# Patient Record
Sex: FEMALE | Race: WHITE | NOT HISPANIC OR LATINO | Employment: FULL TIME | URBAN - NONMETROPOLITAN AREA
[De-identification: names, ages, dates, MRNs, and addresses within clinical notes are randomized per-mention and may not be internally consistent; named-entity substitution may affect disease eponyms.]

---

## 2018-10-04 ENCOUNTER — OFFICE VISIT CONVERTED (OUTPATIENT)
Dept: FAMILY MEDICINE CLINIC | Age: 53
End: 2018-10-04
Attending: FAMILY MEDICINE

## 2019-08-29 ENCOUNTER — OFFICE VISIT CONVERTED (OUTPATIENT)
Dept: FAMILY MEDICINE CLINIC | Age: 54
End: 2019-08-29
Attending: FAMILY MEDICINE

## 2019-08-30 ENCOUNTER — HOSPITAL ENCOUNTER (OUTPATIENT)
Dept: OTHER | Facility: HOSPITAL | Age: 54
Discharge: HOME | End: 2019-08-30
Attending: FAMILY MEDICINE

## 2019-08-30 LAB
ALBUMIN SERPL-MCNC: 4.1 G/DL (ref 3.5–5)
ALBUMIN/GLOB SERPL: 1.2 {RATIO} (ref 1.4–2.6)
ALP SERPL-CCNC: 53 U/L (ref 53–141)
ALT SERPL-CCNC: 11 U/L (ref 10–40)
ANION GAP SERPL CALC-SCNC: 17 MMOL/L (ref 8–19)
AST SERPL-CCNC: 15 U/L (ref 15–50)
BASOPHILS # BLD AUTO: 0.03 10*3/UL (ref 0–0.2)
BASOPHILS NFR BLD AUTO: 0.4 % (ref 0–3)
BILIRUB SERPL-MCNC: 0.63 MG/DL (ref 0.2–1.3)
BUN SERPL-MCNC: 8 MG/DL (ref 5–25)
BUN/CREAT SERPL: 8 {RATIO} (ref 6–20)
CALCIUM SERPL-MCNC: 8.7 MG/DL (ref 8.7–10.4)
CHLORIDE SERPL-SCNC: 100 MMOL/L (ref 99–111)
CHOLEST SERPL-MCNC: 133 MG/DL (ref 107–200)
CHOLEST/HDLC SERPL: 3 {RATIO} (ref 3–6)
CONV ABS IMM GRAN: 0.02 10*3/UL (ref 0–0.2)
CONV CO2: 24 MMOL/L (ref 22–32)
CONV IMMATURE GRAN: 0.3 % (ref 0–1.8)
CONV TOTAL PROTEIN: 7.4 G/DL (ref 6.3–8.2)
CREAT UR-MCNC: 0.96 MG/DL (ref 0.5–0.9)
DEPRECATED RDW RBC AUTO: 47.8 FL (ref 36.4–46.3)
EOSINOPHIL # BLD AUTO: 0.11 10*3/UL (ref 0–0.7)
EOSINOPHIL # BLD AUTO: 1.6 % (ref 0–7)
ERYTHROCYTE [DISTWIDTH] IN BLOOD BY AUTOMATED COUNT: 13.4 % (ref 11.7–14.4)
GFR SERPLBLD BASED ON 1.73 SQ M-ARVRAT: >60 ML/MIN/{1.73_M2}
GLOBULIN UR ELPH-MCNC: 3.3 G/DL (ref 2–3.5)
GLUCOSE SERPL-MCNC: 94 MG/DL (ref 65–99)
HCT VFR BLD AUTO: 40.1 % (ref 37–47)
HDLC SERPL-MCNC: 45 MG/DL (ref 40–60)
HGB BLD-MCNC: 12.5 G/DL (ref 12–16)
LDLC SERPL CALC-MCNC: 76 MG/DL (ref 70–100)
LYMPHOCYTES # BLD AUTO: 2.07 10*3/UL (ref 1–5)
LYMPHOCYTES NFR BLD AUTO: 30.4 % (ref 20–45)
MCH RBC QN AUTO: 30.3 PG (ref 27–31)
MCHC RBC AUTO-ENTMCNC: 31.2 G/DL (ref 33–37)
MCV RBC AUTO: 97.1 FL (ref 81–99)
MONOCYTES # BLD AUTO: 0.56 10*3/UL (ref 0.2–1.2)
MONOCYTES NFR BLD AUTO: 8.2 % (ref 3–10)
NEUTROPHILS # BLD AUTO: 4.01 10*3/UL (ref 2–8)
NEUTROPHILS NFR BLD AUTO: 59.1 % (ref 30–85)
NRBC CBCN: 0 % (ref 0–0.7)
OSMOLALITY SERPL CALC.SUM OF ELEC: 282 MOSM/KG (ref 273–304)
PLATELET # BLD AUTO: 209 10*3/UL (ref 130–400)
PMV BLD AUTO: 13.9 FL (ref 9.4–12.3)
POTASSIUM SERPL-SCNC: 4 MMOL/L (ref 3.5–5.3)
RBC # BLD AUTO: 4.13 10*6/UL (ref 4.2–5.4)
SODIUM SERPL-SCNC: 137 MMOL/L (ref 135–147)
TRIGL SERPL-MCNC: 61 MG/DL (ref 40–150)
TROPONIN T SERPL-MCNC: <0.01 NG/ML (ref 0–0.1)
TSH SERPL-ACNC: 3.33 M[IU]/L (ref 0.27–4.2)
VLDLC SERPL-MCNC: 12 MG/DL (ref 5–37)
WBC # BLD AUTO: 6.8 10*3/UL (ref 4.8–10.8)

## 2019-10-31 ENCOUNTER — OFFICE VISIT CONVERTED (OUTPATIENT)
Dept: FAMILY MEDICINE CLINIC | Age: 54
End: 2019-10-31
Attending: FAMILY MEDICINE

## 2020-02-27 ENCOUNTER — HOSPITAL ENCOUNTER (OUTPATIENT)
Dept: OTHER | Facility: HOSPITAL | Age: 55
Discharge: HOME | End: 2020-02-27
Attending: FAMILY MEDICINE

## 2020-02-27 ENCOUNTER — OFFICE VISIT CONVERTED (OUTPATIENT)
Dept: FAMILY MEDICINE CLINIC | Age: 55
End: 2020-02-27
Attending: FAMILY MEDICINE

## 2020-07-23 ENCOUNTER — HOSPITAL ENCOUNTER (OUTPATIENT)
Dept: OTHER | Facility: HOSPITAL | Age: 55
Discharge: HOME | End: 2020-07-23
Attending: FAMILY MEDICINE

## 2020-07-23 LAB
ALBUMIN SERPL-MCNC: 3.9 G/DL (ref 3.5–5)
ALBUMIN/GLOB SERPL: 1.5 {RATIO} (ref 1.4–2.6)
ALP SERPL-CCNC: 50 U/L (ref 53–141)
ALT SERPL-CCNC: 9 U/L (ref 10–40)
ANION GAP SERPL CALC-SCNC: 14 MMOL/L (ref 8–19)
AST SERPL-CCNC: 15 U/L (ref 15–50)
BASOPHILS # BLD MANUAL: 0.03 10*3/UL (ref 0–0.2)
BASOPHILS NFR BLD MANUAL: 0.5 % (ref 0–3)
BILIRUB SERPL-MCNC: 0.56 MG/DL (ref 0.2–1.3)
BUN SERPL-MCNC: 7 MG/DL (ref 5–25)
BUN/CREAT SERPL: 8 {RATIO} (ref 6–20)
CALCIUM SERPL-MCNC: 9.3 MG/DL (ref 8.7–10.4)
CHLORIDE SERPL-SCNC: 103 MMOL/L (ref 99–111)
CHOLEST SERPL-MCNC: 133 MG/DL (ref 107–200)
CHOLEST/HDLC SERPL: 2.8 {RATIO} (ref 3–6)
CONV CO2: 25 MMOL/L (ref 22–32)
CONV TOTAL PROTEIN: 6.5 G/DL (ref 6.3–8.2)
CREAT UR-MCNC: 0.91 MG/DL (ref 0.5–0.9)
DEPRECATED RDW RBC AUTO: 46.9 FL
EOSINOPHIL # BLD MANUAL: 0.06 10*3/UL (ref 0–0.7)
EOSINOPHIL NFR BLD MANUAL: 1 % (ref 0–7)
ERYTHROCYTE [DISTWIDTH] IN BLOOD BY AUTOMATED COUNT: 13.2 % (ref 11.5–14.5)
GFR SERPLBLD BASED ON 1.73 SQ M-ARVRAT: >60 ML/MIN/{1.73_M2}
GLOBULIN UR ELPH-MCNC: 2.6 G/DL (ref 2–3.5)
GLUCOSE SERPL-MCNC: 92 MG/DL (ref 65–99)
GRANS (ABSOLUTE): 3.66 10*3/UL (ref 2–8)
GRANS: 58.4 % (ref 30–85)
HBA1C MFR BLD: 12 G/DL (ref 12–16)
HCT VFR BLD AUTO: 37 % (ref 37–47)
HDLC SERPL-MCNC: 47 MG/DL (ref 40–60)
IMM GRANULOCYTES # BLD: 0.01 10*3/UL (ref 0–0.54)
IMM GRANULOCYTES NFR BLD: 0.2 % (ref 0–0.43)
LDLC SERPL CALC-MCNC: 74 MG/DL (ref 70–100)
LYMPHOCYTES # BLD MANUAL: 1.93 10*3/UL (ref 1–5)
LYMPHOCYTES NFR BLD MANUAL: 9.1 % (ref 3–10)
MCH RBC QN AUTO: 30.9 PG (ref 27–31)
MCHC RBC AUTO-ENTMCNC: 32.4 G/DL (ref 33–37)
MCV RBC AUTO: 95.4 FL (ref 81–99)
MONOCYTES # BLD AUTO: 0.57 10*3/UL (ref 0.2–1.2)
OSMOLALITY SERPL CALC.SUM OF ELEC: 284 MOSM/KG (ref 273–304)
PLATELET # BLD AUTO: 206 10*3/UL (ref 130–400)
PMV BLD AUTO: 12.4 FL (ref 7.4–10.4)
POTASSIUM SERPL-SCNC: 4.2 MMOL/L (ref 3.5–5.3)
RBC # BLD AUTO: 3.88 10*6/UL (ref 4.2–5.4)
SODIUM SERPL-SCNC: 138 MMOL/L (ref 135–147)
TRIGL SERPL-MCNC: 59 MG/DL (ref 40–150)
TSH SERPL-ACNC: 1.41 M[IU]/L (ref 0.27–4.2)
VARIANT LYMPHS NFR BLD MANUAL: 30.8 % (ref 20–45)
VLDLC SERPL-MCNC: 12 MG/DL (ref 5–37)
WBC # BLD AUTO: 6.26 10*3/UL (ref 4.8–10.8)

## 2020-08-13 ENCOUNTER — OFFICE VISIT CONVERTED (OUTPATIENT)
Dept: FAMILY MEDICINE CLINIC | Age: 55
End: 2020-08-13
Attending: FAMILY MEDICINE

## 2021-05-18 NOTE — PROGRESS NOTES
Jennifer Lu  1965     Office/Outpatient Visit    Visit Date: Thu, Aug 13, 2020 11:21 am    Provider: Tomas Mac MD (Assistant: Maryam Weaver MA)    Location: [facility]        Electronically signed by Tomas Mac MD on  08/13/2020 01:37:37 PM                             Subjective:        CC: Mrs. Lu is a 55 year old White female.  This is a follow-up visit.  pt states she is only taking fluoxetine         HPI: BP today is 110/66 with a HR of 79.      Anxiety is well controlled on prozac 20 mg qd.      Pt was not able to complete PT due to COVID pandemic. She looked up shoulder exercises and she is working on posture, stretching, strength exercises and left shoulder pain improved in about 3 weeks. This helped with numbness and tingling from shoulder to finger tips. Left shoulder and cervical spine x-ray on 2/27/20 left shoulder was about the same, just mild arthritis. C-spine shows multilevel degenerative disc disease greatest from C5-7. There is neural foraminal narrowing (this is the holes spinal nerves exit through) greatest on the right at C5-6 and on the left at C4-5.    ROS:     CONSTITUTIONAL:  Negative for fatigue and fever.      EYES:  Negative for blurred vision.      E/N/T:  Negative for diminished hearing and nasal congestion.      CARDIOVASCULAR:  Negative for chest pain ( resolved ) and palpitations.      RESPIRATORY:  Negative for recent cough and dyspnea.      GASTROINTESTINAL:  Negative for abdominal pain, constipation, diarrhea, nausea and vomiting.      GENITOURINARY:  Negative for dysuria and urinary incontinence.      MUSCULOSKELETAL:  Negative for arthralgias, limb pain ( resolved ) and myalgias.      INTEGUMENTARY/BREAST:  Negative for atypical mole(s) and rash.      NEUROLOGICAL:  Positive for paresthesia ( left upper extremity ).   Negative for weakness.      PSYCHIATRIC:  Negative for anxiety ( (better with prozac) ), depression and sleep disturbance.           Past Medical History / Family History / Social History:         Last Reviewed on 2020 12:27 PM by Tomas Mac    Past Medical History:                 PAST MEDICAL HISTORY         Fracture(s): right leg, near ankle, tibia;     Frequent Sinusitis     wears glasses         GYNECOLOGICAL HISTORY:        No pregnancy-related problems.    Problems with menstrual cycles include heavy bleeding.      Menopause at age partial hyst.    Last Pap was august; No abnormal Paps Hospitalizations: Never         PREVENTIVE HEALTH MAINTENANCE             COLORECTAL CANCER SCREENING: Up to date (colonoscopy q10y; sigmoidoscopy q5y; Cologuard q3y) was last done 2017, Results are in chart; colonoscopy with the following abnormalities noted-- Polyp(s); The next colonoscopy is due    (milagros)     Hepatitis C Medicare Screening: not indicated     MAMMOGRAM: was last done      PAP SMEAR: was last done 2020 with normal results Dr. Norma Villanueva in [facility]         Surgical History:         Positive for    Cholecystectomy,    : X 2;,    Hysterectomy: partial; and    Tubal Ligation: done during c sect; ;     Procedures:    EGD ( GERD 16 )    LEEP procedure (  )     COLONOSCOPY: was last done 16 with the following abnormalaties noted-- gastritis /polyps /tubular adenoma Dr Martinez         Family History:     Father: Healthy     Mother: Arrhythmia ( tachycardia ); Hyperlipidemia;  Breast Cancer;  Asthma; COPD;  Hypothyroidism     Sister(s): Healthy; 2 sister(s) total     Paternal Grandfather: Cause of death was old age     Paternal Grandmother: Coronary Artery Disease     Maternal Grandfather: Cerebrovascular Accident     Maternal Grandmother: Cerebrovascular Accident     two maternal aunts and maturnal grandmother with thyroid disease         Social History:     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          Alcohol:  Does not drink alcohol and never has.      Caffeine:  She admits to consuming caffeine via tea ( 4 servings per day ).          Substance Abuse History:     Last Reviewed on 8/13/2020 12:27 PM by Tomas Mac        Mental Health History:     Last Reviewed on 8/13/2020 12:27 PM by Tomas Mac        Communicable Diseases (eg STDs):     Last Reviewed on 8/13/2020 12:27 PM by Tomas Mac        Current Problems:     Last Reviewed on 8/13/2020 12:27 PM by Tomas Mac    Anxiety disorder, unspecified    Migraine with aura, not intractable, without status migrainosus    Pain in left arm        Immunizations:     Fluzone Quadrivalent (3+ years) 10/3/2018    Influenza Virus Vaccine, unspecified formulation 10/1/2016        Allergies:     Last Reviewed on 8/13/2020 12:27 PM by Tomas Mac    shell fish:      Penicillins:          Current Medications:     Last Reviewed on 8/13/2020 12:27 PM by Tomas Mac    FLUoxetine 20 mg oral tablet [TAKE ONE TABLET BY MOUTH DAILY]    allergy shot 1 x month     traZODone 50 mg oral tablet [1 tab HS PRN]    Esgic 50mg/325mg/40mg Tablet [Take 2 at onset of headache, then 1 Q 1 TO 2 hrs ha. Max 5 in 12 hours]        Objective:        Vitals:         Current: 8/13/2020 11:28:13 AM    Ht:  5 ft, 5.25 in;  Wt: 163 lbs;  BMI: 26.9T: 97.3 F (temporal);  BP: 110/66 mm Hg (left arm, sitting);  P: 79 bpm (left arm (BP Cuff), sitting);  sCr: 0.91 mg/dL;  GFR: 73.60        Exams:     PHYSICAL EXAM:     GENERAL: vital signs recorded - well developed, well nourished;  well groomed;  no apparent distress;     EYES: extraocular movements intact; conjunctiva and cornea are normal; PERRLA;     NECK: range of motion is normal; thyroid exam reveals not enlarged;     RESPIRATORY: normal respiratory rate and pattern with no distress; normal  breath sounds with no rales, rhonchi, wheezes or rubs;     CARDIOVASCULAR: normal rate; rhythm is regular;  no systolic murmur; no edema;     GASTROINTESTINAL: nontender; normal bowel sounds;     MUSCULOSKELETAL: normal gait; normal overall tone spine: no scoliosis or other abnormal spinal curvatures;     NEUROLOGIC: mental status: alert and oriented x 3; GROSSLY INTACT     PSYCHIATRIC: appropriate affect and demeanor; normal psychomotor function; normal speech pattern; normal thought and perception;         Assessment:         F41.9   Anxiety disorder, unspecified       M79.602   Pain in left arm           ORDERS:         Meds Prescribed:       [Refilled] FLUoxetine 20 mg oral tablet [TAKE ONE TABLET BY MOUTH DAILY], #90 (ninety) tablets, Refills: 3 (three)       [Refilled] FLUoxetine 20 mg oral tablet [TAKE ONE TABLET BY MOUTH DAILY], #30 (thirty) tablets, Refills: 0 (zero)                 Plan:         Anxiety disorder, unspecifiedWell controlled on prozac 20 mg qd. 30 day supply sent to [facility] and annual supply to [facility].          Prescriptions:       [Refilled] FLUoxetine 20 mg oral tablet [TAKE ONE TABLET BY MOUTH DAILY], #90 (ninety) tablets, Refills: 3 (three)       [Refilled] FLUoxetine 20 mg oral tablet [TAKE ONE TABLET BY MOUTH DAILY], #30 (thirty) tablets, Refills: 0 (zero)         Pain in left armFortunately this has resolved with home PT exercises.             Charge Capture:         Primary Diagnosis:     F41.9  Anxiety disorder, unspecified           Orders:      [id number]  Office/outpatient visit; established patient, level 3  (In-House)              M79.602  Pain in left arm

## 2021-05-18 NOTE — PROGRESS NOTES
"Jennifer Lu. 1965     Office/Outpatient Visit    Visit Date: Thu, Oct 31, 2019 10:14 am    Provider: Tomas Mac MD (Assistant: Kae Quiles LPN)    Location: [facility]        Electronically signed by Tomas Mac MD on  10/31/2019 05:28:27 PM                             SUBJECTIVE:        CC:     Ms. Lu is a 54 year old White female.  2 month follow up;         HPI:     Anxiety is better after starting prozac 2 months ago. She was stressed out by \"life\", mother has COPD and  has health issues. This has helped her deal with the anxiety.     Previously having upper chest pain just below the collar bone. This was an intermittent issue for the last year. Stress test was done 19 Normal, reassuring stress test, normal LV, EF 58%.     ROS:     CONSTITUTIONAL:  Negative for fatigue and fever.      EYES:  Negative for blurred vision.      E/N/T:  Negative for diminished hearing and nasal congestion.      CARDIOVASCULAR:  Negative for chest pain ( resolved ) and palpitations.      RESPIRATORY:  Positive for dyspnea ( at rest ).   Negative for recent cough.      GASTROINTESTINAL:  Negative for abdominal pain, constipation, diarrhea, nausea and vomiting.      GENITOURINARY:  Negative for dysuria and urinary incontinence.      MUSCULOSKELETAL:  Negative for arthralgias and myalgias.      INTEGUMENTARY/BREAST:  Negative for atypical mole(s) and rash.      NEUROLOGICAL:  Negative for paresthesias and weakness.      PSYCHIATRIC:  Positive for anxiety ( (better with prozac) ).   Negative for depression or sleep disturbance.          PMH/FM/SH:     Last Reviewed on 10/31/2019 05:26 PM by Tomas Mac    Past Medical History:                 PAST MEDICAL HISTORY         Fracture(s): right leg, near ankle, tibia;     Frequent Sinusitis     wears glasses         GYNECOLOGICAL HISTORY:        No pregnancy-related problems.    Problems with menstrual cycles include heavy bleeding. "      Menopause at age partial hyst.    Last Pap was august; No abnormal Paps Hospitalizations: Never         PREVENTIVE HEALTH MAINTENANCE             COLORECTAL CANCER SCREENING: Up to date (colonoscopy q10y; sigmoidoscopy q5y; Cologuard q3y) was last done 2017, Results are in chart; colonoscopy with the following abnormalities noted-- Polyp(s); The next colonoscopy is due    (milagros)     Hepatitis C Medicare Screening: not indicated     MAMMOGRAM: was last done , scheduled for 10/11/18     PAP SMEAR: No longer indicated due to age and history         Surgical History:         Positive for    Cholecystectomy,    : X 2;,    Hysterectomy: partial; and    Tubal Ligation: done during c sect; ;     Procedures:    EGD ( GERD 16 )    LEEP procedure (  )     COLONOSCOPY: was last done 16 with the following abnormalaties noted-- gastritis /polyps /tubular adenoma Dr Martinez         Family History:     Father: Healthy     Mother: Arrhythmia ( tachycardia ); Hyperlipidemia;  Breast Cancer;  Asthma; COPD;  Hypothyroidism     Sister(s): Healthy; 2 sister(s) total     Paternal Grandfather: Cause of death was old age     Paternal Grandmother: Coronary Artery Disease     Maternal Grandfather: Cerebrovascular Accident     Maternal Grandmother: Cerebrovascular Accident     two maternal aunts and maturnal grandmother with thyroid disease         Social History:     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      Caffeine:  She admits to consuming caffeine via tea ( 4 servings per day ).          Substance Abuse History:     Last Reviewed on 10/31/2019 05:26 PM by Tomas Mac        Mental Health History:     Last Reviewed on 10/31/2019 05:26 PM by  Tomas Mac        Communicable Diseases (eg STDs):     Last Reviewed on 10/31/2019 05:26 PM by Tomas Mac            Current Problems:     Last Reviewed on 10/31/2019 05:26 PM by Tomas Mac    Classic migraine     Fatigue     Family history of coronary artery disease     Supraventricular tachycardia     Anxiety     Family history of CVA     Chest pain, other type         Immunizations:     Fluzone Quadrivalent (3+ years) 10/3/2018     Influenza Virus Vaccine, unspecified formulation 10/1/2016         Allergies:     Last Reviewed on 10/31/2019 05:26 PM by Tomas Mac    Penicillins:    shell fish:        Current Medications:     Last Reviewed on 10/31/2019 05:26 PM by Tomas Mac    Esgic 50mg/325mg/40mg Tablet Take 2 at onset of headache, then 1 Q 1 TO 2 hrs ha. Max 5 in 12 hours     Fluoxetine 10mg Tablet Take 1 tablet(s) by mouth daily     Trazodone HCl 50mg Tablet 1 tab HS PRN     allergy shot 1 x month         OBJECTIVE:        Vitals:         Current: 10/31/2019 10:23:08 AM    Ht:  5 ft, 5.25 in;  Wt: 188.6 lbs;  BMI: 31.1    T: 98.6 F (oral);  BP: 126/64 mm Hg (left arm, sitting);  P: 73 bpm (left arm (BP Cuff), sitting);  sCr: 0.96 mg/dL;  GFR: 75.07        Exams:     PHYSICAL EXAM:     GENERAL: vital signs recorded - well developed, well nourished;  well groomed;  no apparent distress;     EYES: extraocular movements intact; conjunctiva and cornea are normal; PERRLA;     NECK: range of motion is normal; thyroid exam reveals not enlarged;     RESPIRATORY: normal respiratory rate and pattern with no distress; normal breath sounds with no rales, rhonchi, wheezes or rubs;     CARDIOVASCULAR: normal rate; rhythm is regular;  no systolic murmur; no edema;     GASTROINTESTINAL: nontender; normal bowel sounds;     MUSCULOSKELETAL: normal gait; normal overall tone     NEUROLOGIC: mental status: alert and oriented x 3; GROSSLY INTACT     PSYCHIATRIC: appropriate affect and demeanor; normal  psychomotor function; normal speech pattern; normal thought and perception;         ASSESSMENT           300.02   F41.9  Anxiety              DDx:     786.59   R07.89  Chest pain, other type              DDx:         ORDERS:         Meds Prescribed:       Refill of: Fluoxetine 10mg Tablet Take 1 tablet(s) by mouth daily  #90 (Ninety) tablet(s) Refills: 2         Lab Orders:       APPTO  Appointment need  (In-House)                   PLAN:          Anxiety Anxiety improved with prozac 10 mg qd. Pt advised we can increase this to 20 if anxiety worsens.         FOLLOW-UP: Schedule a follow-up visit in 6 months.:.            Prescriptions:       Refill of: Fluoxetine 10mg Tablet Take 1 tablet(s) by mouth daily  #90 (Ninety) tablet(s) Refills: 2           Orders:       APPTO  Appointment need  (In-House)            Chest pain, other type This appears to have been anxiety related as cardiac w/u is normal and this improved with prozac.             Patient Recommendations:        For  Anxiety:     Schedule a follow-up visit in 6 months.                APPOINTMENT INFORMATION:        Monday Tuesday Wednesday Thursday Friday Saturday Sunday            Time:___________________AM  PM   Date:_____________________             CHARGE CAPTURE           **Please note: ICD descriptions below are intended for billing purposes only and may not represent clinical diagnoses**        Primary Diagnosis:         300.02 Anxiety            F41.9    Anxiety disorder, unspecified              Orders:          [id number]   Office/outpatient visit; established patient, level 3  (In-House)             APPTO   Appointment need  (In-House)           786.59 Chest pain, other type            R07.89    Other chest pain

## 2021-05-18 NOTE — PROGRESS NOTES
Jennifer Lu 1965     Office/Outpatient Visit    Visit Date: Thu, Aug 29, 2019 02:11 pm    Provider: Tomas Mac MD (Assistant: Hodan Basurto MA)    Location: [facility]        Electronically signed by Tomas Mac MD on  08/29/2019 06:31:36 PM                             SUBJECTIVE:        CC:     Ms. Lu is a 54 year old White female.  She presents with back  pain.          HPI:     Pt reports upper back pain, between shoulder blades, area about 12 cm across. This has been present for about 6 months. Feels as something is touching her back all the time. Upper back pain is constant, kind of a nerve pain like almost numb but slightly painful. This is 3/10 in intensity, more just aggravating. Nothing seems to make that pain better or worse.     Upper chest pain, just below the collar bone, this started about 3 months ago. This pain is sharp, stabbing, and is 5/10 in intensity. This comes and goes, might last 15-20 minutes, typically happens almost every day, multiple times a day. 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 She is a little more anxious at work, she some times has to give 20 shots in a day to a single patient ([place]aine[place] out). She took medicine for anxiety several years ago (10 years ago). Maybe prozac and this helped some.     Pt reports long standing anxiety that maybe is a little worse recently. She took prozac several years ago (10 years ago) when her son was a teenager and going through personal issues. Prozac helped some. She is a little more anxious at work, she some times has to give 20 shots in a day to a single patient ([place] out) and this makes her worries about a patient getting a systemic reaction.     ROS:     CONSTITUTIONAL:  Negative for fatigue and fever.      EYES:  Negative for blurred vision.      E/N/T:  Negative for diminished hearing and nasal congestion.      CARDIOVASCULAR:  Positive for chest pain.    Negative for palpitations.      RESPIRATORY:  Positive for dyspnea ( at rest ).   Negative for recent cough.      GASTROINTESTINAL:  Negative for abdominal pain, constipation, diarrhea, nausea and vomiting.      GENITOURINARY:  Negative for dysuria and urinary incontinence.      MUSCULOSKELETAL:  Negative for arthralgias and myalgias.      INTEGUMENTARY/BREAST:  Negative for atypical mole(s) and rash.      NEUROLOGICAL:  Negative for paresthesias and weakness.      PSYCHIATRIC:  Negative for anxiety, depression and sleep disturbance.          PMH/FMH/SH:     Last Reviewed on 2019 06:30 PM by Tomas Mac    Past Medical History:                 PAST MEDICAL HISTORY         Fracture(s): right leg, near ankle, tibia;     Frequent Sinusitis     wears glasses         GYNECOLOGICAL HISTORY:        No pregnancy-related problems.    Problems with menstrual cycles include heavy bleeding.      Menopause at age partial hyst.    Last Pap was august; No abnormal Paps Hospitalizations: Never         PREVENTIVE HEALTH MAINTENANCE             COLORECTAL CANCER SCREENING: Up to date (colonoscopy q10y; sigmoidoscopy q5y; Cologuard q3y) was last done 2017, Results are in chart; colonoscopy with the following abnormalities noted-- Polyp(s); The next colonoscopy is due    (milagros)     Hepatitis C Medicare Screening: not indicated     MAMMOGRAM: was last done , scheduled for 10/11/18     PAP SMEAR: No longer indicated due to age and history         Surgical History:         Positive for    Cholecystectomy,    : X 2;,    Hysterectomy: partial; and    Tubal Ligation: done during c sect; ;     Procedures:    EGD ( GERD 16 )    LEEP procedure (  )     COLONOSCOPY: was last done 16 with the following abnormalaties noted-- gastritis /polyps /tubular adenoma Dr Martinez         Family History:     Father: Healthy     Mother: Arrhythmia ( tachycardia ); Hyperlipidemia;  Breast Cancer;   Asthma; COPD;  Hypothyroidism     Sister(s): Healthy; 2 sister(s) total     Paternal Grandfather: Cause of death was old age     Paternal Grandmother: Coronary Artery Disease     Maternal Grandfather: Cerebrovascular Accident     Maternal Grandmother: Cerebrovascular Accident     two maternal aunts and maturnal grandmother with thyroid disease         Social History:     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      Caffeine:  She admits to consuming caffeine via tea ( 4 servings per day ).          Substance Abuse History:     Last Reviewed on 8/29/2019 06:30 PM by Tomas Mac        Mental Health History:     Last Reviewed on 8/29/2019 06:30 PM by Tomas Mac        Communicable Diseases (eg STDs):     Last Reviewed on 8/29/2019 06:30 PM by Tomas Mac            Current Problems:     Last Reviewed on 8/29/2019 06:30 PM by Tomas Mac    Classic migraine     Fatigue     Family history of coronary artery disease     Supraventricular tachycardia     Anxiety     Family history of CVA     Chest pain     Upper back pain         Immunizations:     Fluzone Quadrivalent (3+ years) 10/3/2018     Influenza Virus Vaccine, unspecified formulation 10/1/2016         Allergies:     Last Reviewed on 8/29/2019 06:30 PM by Tomas Mac    Penicillins:    shell fish:        Current Medications:     Last Reviewed on 8/29/2019 06:30 PM by Tomas Mac    Esgic 50mg/325mg/40mg Tablet Take 2 at onset of headache, then 1 Q 1 TO 2 hrs ha. Max 5 in 12 hours     Trazodone HCl 50mg Tablet 1 tab HS PRN     allergy shot 1 x month         OBJECTIVE:        Vitals:         Current: 8/29/2019 2:17:22 PM    Ht:  5 ft, 5.25 in;  Wt: 190 lbs;  BMI: 31.4    T: 98.8 F (oral);  BP:  124/76 mm Hg (left arm, sitting);  P: 79 bpm (left arm (BP Cuff), sitting);  sCr: 0.91 mg/dL;  GFR: 79.44        Exams:     PHYSICAL EXAM:     GENERAL: vital signs recorded - well developed, well nourished;  well groomed;  no apparent distress;     EYES: extraocular movements intact; conjunctiva and cornea are normal; PERRLA;     NECK: range of motion is normal; thyroid exam reveals not enlarged;     RESPIRATORY: normal respiratory rate and pattern with no distress; normal breath sounds with no rales, rhonchi, wheezes or rubs;     CARDIOVASCULAR: normal rate; rhythm is regular;  no systolic murmur; no edema;     GASTROINTESTINAL: nontender; normal bowel sounds;     MUSCULOSKELETAL: normal gait; normal overall tone     NEUROLOGIC: mental status: alert and oriented x 3;     PSYCHIATRIC: affect/demeanor: anxious;  normal psychomotor function; normal speech pattern; normal thought and perception;         ASSESSMENT           724.1   M54.89  Upper back pain              DDx:     786.51   R07.89  Chest pain              DDx:     300.02   F41.9  Anxiety              DDx:         ORDERS:         Meds Prescribed:       Fluoxetine 10mg Tablet Take 1 tablet(s) by mouth daily  #30 (Thirty) tablet(s) Refills: 2         Lab Orders:       FUTURE  Future order to be done at patients convenience  (Send-Out)         [id number] CMP, CBC, TSH, LIPID: [id number], [id number], [id number], [id number]  (Send-Out)         21460  [facility] - [facility] Troponin, quantitative  (Send-Out)         APPTO  Appointment need  (In-House)                   PLAN:          Upper back pain This appears to be related to posture and presentation and exam is overall benign appearing. We discussed posture and I printed for her posture exercises for her.           Patient Education Handouts:       Exercises - Treatment for Low Back Pain    PTC           Chest pain Chest pain is most likely related to anxiety and we are restarting prozac for this. Because this is an  on-going cocern for her I think a more definitive cardiac w/u is warranted and pt is therefore referred for stress test. EKG 1 year ago with similar Sx was reassuring (as was CXR and labs). We will repeat labs and add troponin and fasting labs tomorrow is appropriate as there is low concern for ACS and this is more convenient for patient to get labs all at once.         TESTS/PROCEDURES:  Will proceed with Cardiolite Stress Test (nuclear) to be performed/scheduled now.      FOLLOW-UP: Schedule a follow-up visit in 2 months..      FOLLOW-UP TESTING #1: FOLLOW-UP LABORATORY:  Labs to be scheduled in the future include PHYSICAL PANEL; CMP, CBC, TSH, LIPID.            Orders:       FUTURE  Future order to be done at patients convenience  (Send-Out)         22434  [facility] PHYSICAL: CMP, CBC, TSH, LIPID: [id number], [id number], [id number], [id number]  (Send-Out)         82629  [facility] - [facility] Troponin, quantitative  (Send-Out)         APPTO  Appointment need  (In-House)            Anxiety Will restart fluoxetine 10 mg (pt prefers to stay on lowest dose) for anxiety.           Prescriptions:       Fluoxetine 10mg Tablet Take 1 tablet(s) by mouth daily  #30 (Thirty) tablet(s) Refills: 2             Patient Recommendations:        For  Chest pain:     Schedule a follow-up visit in 2 months.                APPOINTMENT INFORMATION:        Monday Tuesday Wednesday Thursday Friday Saturday Sunday            Time:___________________AM  PM   Date:_____________________         The following laboratory testing has been ordered:             CHARGE CAPTURE           **Please note: ICD descriptions below are intended for billing purposes only and may not represent clinical diagnoses**        Primary Diagnosis:         724.1 Upper back pain            M54.89    Other dorsalgia              Orders:          33928   Office/outpatient visit; established patient, level 4  (In-House)           786.51 Chest pain            R07.89    Other chest pain               Orders:          APPTO   Appointment need  (In-House)           300.02 Anxiety            F41.9    Anxiety disorder, unspecified

## 2021-05-18 NOTE — PROGRESS NOTES
"Jennifer Lu. 1965     Office/Outpatient Visit    Visit Date: Thu, Oct 4, 2018 09:47 am    Provider: Tomas Mac,   (Assistant: Amie Garcia MA)    Location: [facility]        Electronically signed by Tomas Mac,   on  10/04/2018 01:35:31 PM                             SUBJECTIVE:        CC:     Ms. Lu is a 53 year old White female.  physical exam patient presents today for physical exam.;         HPI:         Patient to be evaluated for health checkup.  Her last physical exam was 1 year ago.          PHQ-9 Depression Screening: Completed form scanned and in chart; Total Score 3 Alcohol Consumption Screening: Completed form scanned and in chart; Total Score 0     Occassionally feels as if she can't take a deep breath. Happens since June, might happen several times per day or not at all.     4/10, chest \"twinge\" not so much tightness. Not associated with anything. This also since June, may last a couple weeks several times per day and then just goes away. Not true pain but more \"twinge\" and not associated aforementioned inability to take a deep breath.     ROS:     CONSTITUTIONAL:  Negative for fatigue and fever.      EYES:  Negative for blurred vision.      E/N/T:  Negative for diminished hearing and nasal congestion.      CARDIOVASCULAR:  Positive for chest pain.   Negative for palpitations.      RESPIRATORY:  Positive for dyspnea ( at rest ).   Negative for recent cough.      GASTROINTESTINAL:  Negative for abdominal pain, constipation, diarrhea, nausea and vomiting.      GENITOURINARY:  Negative for dysuria and urinary incontinence.      MUSCULOSKELETAL:  Negative for arthralgias and myalgias.      INTEGUMENTARY/BREAST:  Negative for atypical mole(s) and rash.      NEUROLOGICAL:  Negative for paresthesias and weakness.      PSYCHIATRIC:  Negative for anxiety, depression and sleep disturbance.          PMH/FMH/SH:     Last Reviewed on 10/04/2018 01:33 PM by Tomas Mac    " Past Medical History:                 PAST MEDICAL HISTORY         Fracture(s): right leg, near ankle, tibia;     Frequent Sinusitis     wears glasses         GYNECOLOGICAL HISTORY:        No pregnancy-related problems.    Problems with menstrual cycles include heavy bleeding.      Menopause at age partial hyst.    Last Pap was august; No abnormal Paps Hospitalizations: Never         PREVENTIVE HEALTH MAINTENANCE             COLORECTAL CANCER SCREENING: Up to date (colonoscopy q10y; sigmoidoscopy q5y; Cologuard q3y) was last done 2017, Results are in chart; colonoscopy with the following abnormalities noted-- Polyp(s); The next colonoscopy is due    (milagros)     Hepatitis C Medicare Screening: not indicated     MAMMOGRAM: was last done , scheduled for 10/11/18 with normal results     PAP SMEAR: No longer indicated due to age and history         Surgical History:         Positive for    Cholecystectomy,    : X 2;,    Hysterectomy: partial; and    Tubal Ligation: done during c sect; ;     Procedures:    EGD ( GERD 16 )    LEEP procedure (  )     COLONOSCOPY: was last done 16 with the following abnormalaties noted-- gastritis /polyps /tubular adenoma Dr Martinez         Family History:     Father: Healthy     Mother: Arrhythmia ( tachycardia ); Hyperlipidemia;  Breast Cancer;  Asthma; COPD;  Hypothyroidism     Sister(s): Healthy; 2 sister(s) total     Paternal Grandfather: Cause of death was old age     Paternal Grandmother: Coronary Artery Disease     Maternal Grandfather: Cerebrovascular Accident     Maternal Grandmother: Cerebrovascular Accident     two maternal aunts and maturnal grandmother with thyroid disease         Social History:     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      Caffeine:  She admits to consuming caffeine via tea ( 4 servings per day ).          Substance Abuse History:     Last Reviewed on 10/04/2018 01:33 PM by Tomas Mac        Mental Health History:     Last Reviewed on 10/04/2018 01:33 PM by Tomas Mac        Communicable Diseases (eg STDs):     Last Reviewed on 10/04/2018 01:33 PM by Tomas Mac            Current Problems:     Last Reviewed on 10/04/2018 01:33 PM by Tomas Mac    Classic migraine     Fatigue     Family history of coronary artery disease     Anxiety     Supraventricular tachycardia     Family history of CVA     Chest pain, other type     Shortness of Breath     Screening for depression         Immunizations:     Fluzone Quadrivalent (3+ years) 10/3/2018     Influenza Virus Vaccine, unspecified formulation 10/1/2016         Allergies:     Last Reviewed on 10/04/2018 01:33 PM by Tomas Mac    Penicillins:    shell fish:        Current Medications:     Last Reviewed on 10/04/2018 01:33 PM by Tomas Mac    Esgic 50mg/325mg/40mg Tablet Take 2 at onset of headache, then 1 Q 1 TO 2 hrs ha. Max 5 in 12 hours     Trazodone HCl 50mg Tablet 1 tab HS PRN     allergy shot 1 x month         OBJECTIVE:        Vitals:         Current: 10/4/2018 9:52:28 AM    Ht:  5 ft, 5.25 in;  Wt: 191.4 lbs;  BMI: 31.6    T: 98.3 F (oral);  BP: 118/53 mm Hg (left arm, sitting);  P: 77 bpm (left arm (BP Cuff), sitting);  sCr: 0.85 mg/dL;  GFR: 86.27        Exams:     PHYSICAL EXAM:     GENERAL: vital signs recorded - well developed, well nourished;  well groomed;  no apparent distress;     EYES: extraocular movements intact; conjunctiva and cornea are normal; PERRLA;     E/N/T: OROPHARYNX:  normal mucosa, dentition, gingiva, and posterior pharynx;     NECK: range of motion is normal; thyroid exam reveals not enlarged;     RESPIRATORY: normal respiratory rate and pattern with no distress;  normal breath sounds with no rales, rhonchi, wheezes or rubs;     CARDIOVASCULAR: normal rate; rhythm is regular;  no systolic murmur; no edema;     GASTROINTESTINAL: nontender; normal bowel sounds;     LYMPHATIC: no enlargement of cervical or facial nodes;     MUSCULOSKELETAL: normal gait; normal overall tone     NEUROLOGIC: mental status: alert and oriented x 3;     PSYCHIATRIC:  appropriate affect and demeanor; normal speech pattern; grossly normal memory;         Lab/Test Results:         LABORATORY RESULTS: EKG performed by a[facility]         ASSESSMENT           V70.0   Z00.00  Health checkup              DDx:     V79.0   Z13.89  Screening for depression              DDx:     786.05   R06.02  Shortness of Breath              DDx:     786.59   R07.89  Chest pain, other type              DDx:         ORDERS:         Radiology/Test Orders:       [id number]  Electrocardiogram, routine with at least 12 leads; with interpretation and report  (In-House)         60327  Radiologic exam chest 2 views  (Send-Out)         3017F  Colorectal CA screen results documented and reviewed (PV)  (In-House)           Lab Orders:       [id number]  [facility] CBC with 3 part diff  (Send-Out)         95925  [facility] - [facility] CMP AND LIPID: [id number], [id number]  (Send-Out)           Other Orders:         Negative EtOH screen  (In-House)                   PLAN:          Health checkup     LABORATORY:  Labs ordered to be performed today include CBC and HTN/Lipid Panel: CMP, Lipid.  MIPS PHQ-9 Depression Screening: Completed form scanned and in chart; Total Score 3 Negative alcohol screen     COLORECTAL CANCER SCREENING: Results are in chart           Orders:       [id number]  [facility] CBC with 3 part diff  (Send-Out)         05020  [facility] - H CMP AND LIPID: [id number], [id number]  (Send-Out)         3017F  Colorectal CA screen results documented and reviewed (PV)  (In-House)           Negative EtOH screen  (In-House)            Shortness of Breath         RADIOLOGY:   I have ordered a chest x-ray (PA and lateral) to be done today.  Unclear etiology but benign description. CXR ordered to assess.            Orders:       [id number]  Radiologic exam chest 2 views  (Send-Out)            Chest pain, other type         TESTS/PROCEDURES:  Will proceed with an ECG to be performed/scheduled now.  EKG is normal/reassuring. Unclear etiology of chest pain but description is atypical and unlikely to be cardiac.            Orders:       [id number]  Electrocardiogram, routine with at least 12 leads; with interpretation and report  (In-House)               CHARGE CAPTURE           **Please note: ICD descriptions below are intended for billing purposes only and may not represent clinical diagnoses**        Primary Diagnosis:         V70.0 Health checkup            Z00.00    Encntr for general adult medical exam w/o abnormal findings              Orders:          20865   Preventive medicine, established patient, age 40-64 years  (In-House)             3017F   Colorectal CA screen results documented and reviewed (PV)  (In-House)                Negative EtOH screen  (In-House)           V79.0 Screening for depression            Z13.89    Encounter for screening for other disorder              Orders:          [id number] -25  Office/outpatient visit; established patient, level 4  (In-House)           786.05 Shortness of Breath            R06.02    Shortness of breath    786.59 Chest pain, other type            R07.89    Other chest pain              Orders:          [id number]   Electrocardiogram, routine with at least 12 leads; with interpretation and report  (In-House)

## 2021-05-18 NOTE — PROGRESS NOTES
"Jennifer Lu  1965     Office/Outpatient Visit    Visit Date: Thu, Feb 27, 2020 10:11 am    Provider: Tomas Mac MD (Assistant: Christianne Lu MA)    Location: [facility]        Electronically signed by Tomas Mac MD on  02/27/2020 07:40:31 PM                             Subjective:        CC: Ms. Lu is a 54 year old White female.  This is a follow-up visit.  on back pain, numbness in left arm;         HPI:       Anxiety is much better on prozac 20 mg qd. She was stressed out by \"life\", mother has COPD and  has health issues. This has helped her deal with the anxiety.      About 6 weeks ago pt woke up with severe pain in left arm. More numbness and tingling from shoulder to finger tips. This improved after getting a massage 1 week later, she got a second massage 2 weeks later. Numbness today is front and back of her left hand involving the 2nd and 3rd finger and to a lesser extent her thumb. It will shoot down the posterior arm occasionally. Not worse with any specific motions, nothing makes it better, not better or worse any certain time of day. This is a daily, multiple times a day problem.     ROS:     CONSTITUTIONAL:  Negative for fatigue and fever.      EYES:  Negative for blurred vision.      E/N/T:  Negative for diminished hearing and nasal congestion.      CARDIOVASCULAR:  Negative for chest pain ( resolved ) and palpitations.      RESPIRATORY:  Negative for recent cough and dyspnea.      GASTROINTESTINAL:  Negative for abdominal pain, constipation, diarrhea, nausea and vomiting.      GENITOURINARY:  Negative for dysuria and urinary incontinence.      MUSCULOSKELETAL:  Positive for limb pain ( left upper extremity pain ).   Negative for arthralgias or myalgias.      INTEGUMENTARY/BREAST:  Negative for atypical mole(s) and rash.      NEUROLOGICAL:  Positive for paresthesia ( left upper extremity ).   Negative for weakness.      PSYCHIATRIC:  Positive for " anxiety ( (better with prozac) ).   Negative for depression or sleep disturbance.          Past Medical History / Family History / Social History:         Last Reviewed on 2020 10:49 AM by Tomas Mac    Past Medical History:                 PAST MEDICAL HISTORY         Fracture(s): right leg, near ankle, tibia;     Frequent Sinusitis     wears glasses         GYNECOLOGICAL HISTORY:        No pregnancy-related problems.    Problems with menstrual cycles include heavy bleeding.      Menopause at age partial hyst.    Last Pap was august; No abnormal Paps Hospitalizations: Never         PREVENTIVE HEALTH MAINTENANCE             COLORECTAL CANCER SCREENING: Up to date (colonoscopy q10y; sigmoidoscopy q5y; Cologuard q3y) was last done 2017, Results are in chart; colonoscopy with the following abnormalities noted-- Polyp(s); The next colonoscopy is due    (milagros)     Hepatitis C Medicare Screening: not indicated     MAMMOGRAM: was last done , scheduled for 10/11/18     PAP SMEAR: No longer indicated due to age and history         Surgical History:         Positive for    Cholecystectomy,    : X 2;,    Hysterectomy: partial; and    Tubal Ligation: done during c sect; ;     Procedures:    EGD ( GERD 16 )    LEEP procedure (  )     COLONOSCOPY: was last done 16 with the following abnormalaties noted-- gastritis /polyps /tubular adenoma Dr Martinez         Family History:     Father: Healthy     Mother: Arrhythmia ( tachycardia ); Hyperlipidemia;  Breast Cancer;  Asthma; COPD;  Hypothyroidism     Sister(s): Healthy; 2 sister(s) total     Paternal Grandfather: Cause of death was old age     Paternal Grandmother: Coronary Artery Disease     Maternal Grandfather: Cerebrovascular Accident     Maternal Grandmother: Cerebrovascular Accident     two maternal aunts and maturnal grandmother with thyroid disease         Social History:     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          Alcohol:  Does not drink alcohol and never has.      Caffeine:  She admits to consuming caffeine via tea ( 4 servings per day ).          Substance Abuse History:     Last Reviewed on 2/27/2020 10:49 AM by Tomas Mac        Mental Health History:     Last Reviewed on 2/27/2020 10:49 AM by Tomas Mac        Communicable Diseases (eg STDs):     Last Reviewed on 2/27/2020 10:49 AM by Tomas Mac        Current Problems:     Last Reviewed on 2/27/2020 10:49 AM by Tomas Mac    Anxiety disorder, unspecified    Migraine with aura, not intractable, without status migrainosus    Pain in left arm        Immunizations:     Fluzone Quadrivalent (3+ years) 10/3/2018    Influenza Virus Vaccine, unspecified formulation 10/1/2016        Allergies:     Last Reviewed on 2/27/2020 10:49 AM by Tomas Mac    shell fish:      Penicillins:          Current Medications:     Last Reviewed on 2/27/2020 10:49 AM by Tomas Mac    FLUoxetine 20 mg oral tablet [TAKE ONE TABLET BY MOUTH DAILY]    allergy shot 1 x month     traZODone 50 mg oral tablet [1 tab HS PRN]    Esgic 50mg/325mg/40mg Tablet [Take 2 at onset of headache, then 1 Q 1 TO 2 hrs ha. Max 5 in 12 hours]        Objective:        Vitals:         Current: 2/27/2020 10:16:31 AM    Ht:  5 ft, 5.25 in;  Wt: 175.8 lbs;  BMI: 29.0T: 97.2 F (oral);  BP: 112/66 mm Hg (left arm, sitting);  P: 72 bpm (left arm (BP Cuff), sitting);  sCr: 0.96 mg/dL;  GFR: 72.86        Exams:     PHYSICAL EXAM:     GENERAL: vital signs recorded - well developed, well nourished;  well groomed;  no apparent distress;     EYES: extraocular movements intact; conjunctiva and cornea are normal; PERRLA;     NECK: range of motion is normal; thyroid exam reveals not enlarged;     RESPIRATORY:  normal respiratory rate and pattern with no distress; normal breath sounds with no rales, rhonchi, wheezes or rubs;     CARDIOVASCULAR: normal rate; rhythm is regular;  no systolic murmur; no edema;     GASTROINTESTINAL: nontender; normal bowel sounds;     MUSCULOSKELETAL: normal gait; normal overall tone     NEUROLOGIC: spurling test is negative bilaterally; mental status: alert and oriented x 3; GROSSLY INTACT     PSYCHIATRIC: appropriate affect and demeanor; normal psychomotor function; normal speech pattern; normal thought and perception;         Assessment:         F41.9   Anxiety disorder, unspecified       M79.602   Pain in left arm           ORDERS:         Radiology/Test Orders:       [id number]  Radiologic examination, spine, cervical; minimum of four views  (Send-Out)            [id number]  Left Radiologic exam, shoulder; comp, 2 views  (Send-Out)              Lab Orders:       APPTO  Appointment need  (In-House)            FUTURE  Future order to be done at patients convenience  (Send-Out)            [id number]  [facility] PHYSICAL: CMP, CBC, TSH, LIPID: [id number], [id number]  (Send-Out)              Procedures Ordered:       RFPT  Physical Therapist Referral  (Send-Out)                      Plan:         Anxiety disorder, unspecifiedCont prozac 20 mg qd. Anxiety is much better controlled now.        FOLLOW-UP: Schedule a follow-up visit in 3 months.:.      FOLLOW-UP TESTING #1: FOLLOW-UP LABORATORY:  Labs to be scheduled in the future include PHYSICAL PANEL; CMP, CBC, TSH, LIPID.   Patient to schedule in 3 months.            Orders:       APPTO  Appointment need  (In-House)            FUTURE  Future order to be done at patients convenience  (Send-Out)            [id number]  [facility] PHYSICAL: CMP, CBC, TSH, LIPID: [id number], [id number]  (Send-Out)              Pain in left armPresentation is c/w C7 radiculopathy given her distribution of paresthesia. X-rays are ordered to assess and pt referred to PT. Pt is  advised to use anti inflammatories like ibuprofen and ice.         RADIOLOGY:  I have ordered a C-Spine x-ray series and Shoulder x-ray: left shoulder to be done today.      REFERRALS:  Referral initiated to physical therapy ( [facility] ) for evaluation and treatment.            Orders:       [id number]  Radiologic examination, spine, cervical; minimum of four views  (Send-Out)            [id number]  Left Radiologic exam, shoulder; comp, 2 views  (Send-Out)            RFPT  Physical Therapist Referral  (Send-Out)                  Patient Recommendations:        For  Anxiety disorder, unspecified:    Schedule a follow-up visit in 3 months.                APPOINTMENT INFORMATION:        Monday Tuesday Wednesday Thursday Friday Saturday Sunday            Time:___________________AM  PM   Date:_____________________         The following laboratory testing has been ordered: Schedule the above testing in 3 months.              Charge Capture:         Primary Diagnosis:     F41.9  Anxiety disorder, unspecified           Orders:      [id number]  Office/outpatient visit; established patient, level 4  (In-House)            APPTO  Appointment need  (In-House)              M79.602  Pain in left arm

## 2021-07-01 VITALS
TEMPERATURE: 98.6 F | HEART RATE: 73 BPM | BODY MASS INDEX: 31.42 KG/M2 | SYSTOLIC BLOOD PRESSURE: 126 MMHG | WEIGHT: 188.6 LBS | DIASTOLIC BLOOD PRESSURE: 64 MMHG | HEIGHT: 65 IN

## 2021-07-01 VITALS
WEIGHT: 190 LBS | DIASTOLIC BLOOD PRESSURE: 76 MMHG | SYSTOLIC BLOOD PRESSURE: 124 MMHG | HEART RATE: 79 BPM | TEMPERATURE: 98.8 F | HEIGHT: 65 IN | BODY MASS INDEX: 31.65 KG/M2

## 2021-07-01 VITALS
TEMPERATURE: 98.3 F | DIASTOLIC BLOOD PRESSURE: 53 MMHG | HEIGHT: 65 IN | WEIGHT: 191.4 LBS | SYSTOLIC BLOOD PRESSURE: 118 MMHG | BODY MASS INDEX: 31.89 KG/M2 | HEART RATE: 77 BPM

## 2021-07-02 VITALS
SYSTOLIC BLOOD PRESSURE: 112 MMHG | HEART RATE: 72 BPM | DIASTOLIC BLOOD PRESSURE: 66 MMHG | HEIGHT: 65 IN | BODY MASS INDEX: 29.29 KG/M2 | TEMPERATURE: 97.2 F | WEIGHT: 175.8 LBS

## 2021-07-02 VITALS
HEART RATE: 79 BPM | WEIGHT: 163 LBS | BODY MASS INDEX: 27.16 KG/M2 | TEMPERATURE: 97.3 F | DIASTOLIC BLOOD PRESSURE: 66 MMHG | HEIGHT: 65 IN | SYSTOLIC BLOOD PRESSURE: 110 MMHG

## 2021-10-28 ENCOUNTER — LAB (OUTPATIENT)
Dept: LAB | Facility: HOSPITAL | Age: 56
End: 2021-10-28

## 2021-10-28 ENCOUNTER — OFFICE VISIT (OUTPATIENT)
Dept: FAMILY MEDICINE CLINIC | Age: 56
End: 2021-10-28

## 2021-10-28 VITALS
HEART RATE: 54 BPM | TEMPERATURE: 97.6 F | SYSTOLIC BLOOD PRESSURE: 107 MMHG | WEIGHT: 172.2 LBS | HEIGHT: 65 IN | DIASTOLIC BLOOD PRESSURE: 59 MMHG | OXYGEN SATURATION: 100 % | BODY MASS INDEX: 28.69 KG/M2

## 2021-10-28 DIAGNOSIS — Z00.00 ANNUAL PHYSICAL EXAM: Primary | ICD-10-CM

## 2021-10-28 DIAGNOSIS — T78.40XS ALLERGIC DISORDER, SEQUELA: ICD-10-CM

## 2021-10-28 DIAGNOSIS — Z13.6 SCREENING FOR CARDIOVASCULAR CONDITION: ICD-10-CM

## 2021-10-28 DIAGNOSIS — F41.9 ANXIETY: ICD-10-CM

## 2021-10-28 DIAGNOSIS — M50.30 DDD (DEGENERATIVE DISC DISEASE), CERVICAL: ICD-10-CM

## 2021-10-28 DIAGNOSIS — Z00.00 ANNUAL PHYSICAL EXAM: ICD-10-CM

## 2021-10-28 PROBLEM — Z80.3 FH: BREAST CANCER IN FIRST DEGREE RELATIVE: Status: ACTIVE | Noted: 2017-07-20

## 2021-10-28 PROBLEM — Z98.890 HISTORY OF LOOP ELECTROSURGICAL EXCISION PROCEDURE (LEEP): Status: ACTIVE | Noted: 2017-07-20

## 2021-10-28 PROBLEM — Z90.711 S/P LAPAROSCOPIC SUPRACERVICAL HYSTERECTOMY: Status: ACTIVE | Noted: 2018-09-27

## 2021-10-28 LAB
ALBUMIN SERPL-MCNC: 4 G/DL (ref 3.5–5.2)
ALBUMIN/GLOB SERPL: 1.4 G/DL
ALP SERPL-CCNC: 63 U/L (ref 39–117)
ALT SERPL W P-5'-P-CCNC: 11 U/L (ref 1–33)
ANION GAP SERPL CALCULATED.3IONS-SCNC: 7.9 MMOL/L (ref 5–15)
AST SERPL-CCNC: 20 U/L (ref 1–32)
BASOPHILS # BLD AUTO: 0.03 10*3/MM3 (ref 0–0.2)
BASOPHILS NFR BLD AUTO: 0.5 % (ref 0–1.5)
BILIRUB SERPL-MCNC: 0.5 MG/DL (ref 0–1.2)
BUN SERPL-MCNC: 9 MG/DL (ref 6–20)
BUN/CREAT SERPL: 9.7 (ref 7–25)
CALCIUM SPEC-SCNC: 8.9 MG/DL (ref 8.6–10.5)
CHLORIDE SERPL-SCNC: 103 MMOL/L (ref 98–107)
CHOLEST SERPL-MCNC: 139 MG/DL (ref 0–200)
CO2 SERPL-SCNC: 28.1 MMOL/L (ref 22–29)
CREAT SERPL-MCNC: 0.93 MG/DL (ref 0.57–1)
DEPRECATED RDW RBC AUTO: 47.6 FL (ref 37–54)
EOSINOPHIL # BLD AUTO: 0.12 10*3/MM3 (ref 0–0.4)
EOSINOPHIL NFR BLD AUTO: 2.2 % (ref 0.3–6.2)
ERYTHROCYTE [DISTWIDTH] IN BLOOD BY AUTOMATED COUNT: 13.7 % (ref 12.3–15.4)
GFR SERPL CREATININE-BSD FRML MDRD: 63 ML/MIN/1.73
GLOBULIN UR ELPH-MCNC: 2.8 GM/DL
GLUCOSE SERPL-MCNC: 96 MG/DL (ref 65–99)
HCT VFR BLD AUTO: 36.8 % (ref 34–46.6)
HDLC SERPL-MCNC: 55 MG/DL (ref 40–60)
HGB BLD-MCNC: 11.9 G/DL (ref 12–15.9)
IMM GRANULOCYTES # BLD AUTO: 0.01 10*3/MM3 (ref 0–0.05)
IMM GRANULOCYTES NFR BLD AUTO: 0.2 % (ref 0–0.5)
LDLC SERPL CALC-MCNC: 74 MG/DL (ref 0–100)
LDLC/HDLC SERPL: 1.37 {RATIO}
LYMPHOCYTES # BLD AUTO: 1.78 10*3/MM3 (ref 0.7–3.1)
LYMPHOCYTES NFR BLD AUTO: 32.5 % (ref 19.6–45.3)
MCH RBC QN AUTO: 30.7 PG (ref 26.6–33)
MCHC RBC AUTO-ENTMCNC: 32.3 G/DL (ref 31.5–35.7)
MCV RBC AUTO: 95.1 FL (ref 79–97)
MONOCYTES # BLD AUTO: 0.53 10*3/MM3 (ref 0.1–0.9)
MONOCYTES NFR BLD AUTO: 9.7 % (ref 5–12)
NEUTROPHILS NFR BLD AUTO: 3.01 10*3/MM3 (ref 1.7–7)
NEUTROPHILS NFR BLD AUTO: 54.9 % (ref 42.7–76)
PLATELET # BLD AUTO: 258 10*3/MM3 (ref 140–450)
PMV BLD AUTO: 12 FL (ref 6–12)
POTASSIUM SERPL-SCNC: 4.2 MMOL/L (ref 3.5–5.2)
PROT SERPL-MCNC: 6.8 G/DL (ref 6–8.5)
RBC # BLD AUTO: 3.87 10*6/MM3 (ref 3.77–5.28)
SODIUM SERPL-SCNC: 139 MMOL/L (ref 136–145)
TRIGL SERPL-MCNC: 43 MG/DL (ref 0–150)
VLDLC SERPL-MCNC: 10 MG/DL (ref 5–40)
WBC # BLD AUTO: 5.48 10*3/MM3 (ref 3.4–10.8)

## 2021-10-28 PROCEDURE — 80053 COMPREHEN METABOLIC PANEL: CPT

## 2021-10-28 PROCEDURE — 99396 PREV VISIT EST AGE 40-64: CPT | Performed by: NURSE PRACTITIONER

## 2021-10-28 PROCEDURE — 80061 LIPID PANEL: CPT

## 2021-10-28 PROCEDURE — 85025 COMPLETE CBC W/AUTO DIFF WBC: CPT

## 2021-10-28 PROCEDURE — 36415 COLL VENOUS BLD VENIPUNCTURE: CPT

## 2021-10-28 RX ORDER — FLUOXETINE HYDROCHLORIDE 20 MG/1
20 CAPSULE ORAL DAILY
COMMUNITY
End: 2021-10-28 | Stop reason: SDUPTHER

## 2021-10-28 RX ORDER — FLUOXETINE HYDROCHLORIDE 20 MG/1
20 CAPSULE ORAL DAILY
Qty: 90 CAPSULE | Refills: 3 | Status: SHIPPED | OUTPATIENT
Start: 2021-10-28 | End: 2022-11-03 | Stop reason: SDUPTHER
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
## 2022-09-07 DIAGNOSIS — M25.50 ARTHRALGIA, UNSPECIFIED JOINT: Primary | ICD-10-CM

## 2022-09-08 ENCOUNTER — LAB (OUTPATIENT)
Dept: LAB | Facility: HOSPITAL | Age: 57
End: 2022-09-08

## 2022-09-08 DIAGNOSIS — M25.50 ARTHRALGIA, UNSPECIFIED JOINT: ICD-10-CM

## 2022-09-08 PROCEDURE — 86038 ANTINUCLEAR ANTIBODIES: CPT

## 2022-09-08 PROCEDURE — 36415 COLL VENOUS BLD VENIPUNCTURE: CPT

## 2022-09-08 PROCEDURE — 85652 RBC SED RATE AUTOMATED: CPT

## 2022-09-08 PROCEDURE — 86431 RHEUMATOID FACTOR QUANT: CPT

## 2022-09-08 PROCEDURE — 86140 C-REACTIVE PROTEIN: CPT

## 2022-09-09 LAB
CHROMATIN AB SERPL-ACNC: <10 IU/ML (ref 0–14)
CRP SERPL-MCNC: <0.3 MG/DL (ref 0–0.5)
ERYTHROCYTE [SEDIMENTATION RATE] IN BLOOD: 6 MM/HR (ref 0–30)

## 2022-09-12 LAB — ANA SER QL: NEGATIVE

## 2022-11-03 ENCOUNTER — TELEPHONE (OUTPATIENT)
Dept: FAMILY MEDICINE CLINIC | Age: 57
End: 2022-11-03

## 2022-11-03 ENCOUNTER — OFFICE VISIT (OUTPATIENT)
Dept: FAMILY MEDICINE CLINIC | Age: 57
End: 2022-11-03

## 2022-11-03 VITALS
HEIGHT: 65 IN | HEART RATE: 69 BPM | WEIGHT: 169 LBS | TEMPERATURE: 98.5 F | DIASTOLIC BLOOD PRESSURE: 63 MMHG | SYSTOLIC BLOOD PRESSURE: 110 MMHG | BODY MASS INDEX: 28.16 KG/M2

## 2022-11-03 DIAGNOSIS — M50.30 DDD (DEGENERATIVE DISC DISEASE), CERVICAL: ICD-10-CM

## 2022-11-03 DIAGNOSIS — S46.819A STRAIN OF TRAPEZIUS MUSCLE, UNSPECIFIED LATERALITY, INITIAL ENCOUNTER: ICD-10-CM

## 2022-11-03 DIAGNOSIS — Z12.31 ENCOUNTER FOR SCREENING MAMMOGRAM FOR MALIGNANT NEOPLASM OF BREAST: ICD-10-CM

## 2022-11-03 DIAGNOSIS — Z00.00 ANNUAL PHYSICAL EXAM: Primary | ICD-10-CM

## 2022-11-03 DIAGNOSIS — F41.9 ANXIETY: ICD-10-CM

## 2022-11-03 PROCEDURE — 99396 PREV VISIT EST AGE 40-64: CPT | Performed by: NURSE PRACTITIONER

## 2022-11-03 RX ORDER — BACLOFEN 10 MG/1
10 TABLET ORAL EVERY 8 HOURS PRN
Qty: 60 TABLET | Refills: 1 | Status: SHIPPED | OUTPATIENT
Start: 2022-11-03

## 2022-11-03 RX ORDER — FLUOXETINE HYDROCHLORIDE 20 MG/1
20 CAPSULE ORAL DAILY
Qty: 90 CAPSULE | Refills: 3 | Status: SHIPPED | OUTPATIENT
Start: 2022-11-03

## 2022-11-03 NOTE — PROGRESS NOTES
Chief Complaint  Jennifer Lu presents to [facility] for Annual Exam (Left hip, knee, and foot pain)    Subjective          History of Present Illness    Jennifer is here today for annual preventive exam.  Has received covid vaccine X 2.  UTD Tdap and zoster vaccine.   Will receive influenza vaccine with employer.   Reports UTD colonoscopy at age 51 with Dr Martinez. Reports advised repeat 10 years.   Last mammogram normal 5/14/2020. She is due for repeat and would like to have ordered.    Hx hysterectomy.   Never smoker.   Has regular dental and eye exams.    She is on fluoxetine for anxiety. Feels doing well on current medication.  She is on allergy injections. Sees [facility].  Hx DDD, cervical spine. Xray 2/27/2020 with multilevel degenerative changes. She has arthritis labs  9/8/22 that were normal. Was experiencing hand and hip pain that has now improved.    Review of Systems   Constitutional: Negative for chills and fever.   HENT: Negative for ear pain and sore throat.    Eyes: Negative for blurred vision and redness.   Respiratory: Negative for cough, shortness of breath and wheezing.    Cardiovascular: Negative for chest pain and palpitations.   Gastrointestinal: Negative for abdominal pain, constipation, diarrhea, nausea and vomiting.   Genitourinary: Negative for dysuria, frequency and urgency.   Musculoskeletal: Positive for arthralgias and neck pain.   Skin: Negative for rash.   Neurological: Negative for dizziness and headache.   Psychiatric/Behavioral: Negative for suicidal ideas and depressed mood.         Allergies   Allergen Reactions   • Shellfish-Derived Products Anaphylaxis   • Penicillins Rash      Past Medical History:   Diagnosis Date   • Allergic    • Anxiety    • Arthritis     In feet and back     Current Outpatient Medications   Medication Sig Dispense Refill   • ALLERGY SERUM INJECTION Inject  under the skin into the appropriate area as  "directed 1 (One) Time. Through pharmacy     • FLUoxetine (PROzac) 20 MG capsule Take 1 capsule by mouth Daily. 90 capsule 3   • baclofen (LIORESAL) 10 MG tablet Take 1 tablet by mouth Every 8 (Eight) Hours As Needed for Muscle Spasms. 60 tablet 1     No current facility-administered medications for this visit.     Past Surgical History:   Procedure Laterality Date   •  SECTION     • CHOLECYSTECTOMY     • SUBTOTAL HYSTERECTOMY     • TUBAL ABDOMINAL LIGATION        Social History     Tobacco Use   • Smoking status: Never   • Smokeless tobacco: Never   Vaping Use   • Vaping Use: Never used   Substance Use Topics   • Alcohol use: Never   • Drug use: Never     Family History   Problem Relation Age of Onset   • COPD Mother    • Arthritis Mother      Health Maintenance Due   Topic Date Due   • COLORECTAL CANCER SCREENING  Never done   • MAMMOGRAM  2022   • ANNUAL PHYSICAL  10/29/2022      Immunization History   Administered Date(s) Administered   • COVID-19 (MODERNA) 1st, 2nd, 3rd Dose Only 2021, 2021   • INFLUENZA SPLIT TRI 10/07/2020   • Influenza TIV (IM) 10/01/2016   • Influenza, Unspecified 2019   • Shingrix 2021, 2022   • Td 2004   • Tdap 2012, 2014, 2014   • influenza Split 2021        Objective     Vitals:    22 1044   BP: 110/63   BP Location: Right arm   Patient Position: Sitting   Pulse: 69   Temp: 98.5 °F (36.9 °C)   TempSrc: Oral   Weight: 76.7 kg (169 lb)   Height: 165.1 cm (65\")     Body mass index is 28.12 kg/m².     Physical Exam  Vitals reviewed.   Constitutional:       General: She is not in acute distress.     Appearance: Normal appearance. She is well-developed.   HENT:      Head: Normocephalic and atraumatic.      Right Ear: Hearing, tympanic membrane and ear canal normal.      Left Ear: Hearing, tympanic membrane and ear canal normal.      Mouth/Throat:      Mouth: Mucous membranes are moist.   Eyes:      Extraocular " Movements: Extraocular movements intact.      Pupils: Pupils are equal, round, and reactive to light.   Cardiovascular:      Rate and Rhythm: Normal rate and regular rhythm.      Pulses: Normal pulses.      Heart sounds: Normal heart sounds.   Pulmonary:      Effort: Pulmonary effort is normal.      Breath sounds: Normal breath sounds.   Abdominal:      General: Bowel sounds are normal.      Palpations: Abdomen is soft.      Tenderness: There is no abdominal tenderness.   Musculoskeletal:         General: Normal range of motion.      Cervical back: Normal range of motion and neck supple.   Skin:     General: Skin is warm and dry.   Neurological:      Mental Status: She is alert and oriented to person, place, and time.   Psychiatric:         Mood and Affect: Mood normal.           Result Review :     The following data was reviewed by: MAGDALENE Wilson on 11/03/2022:          Rheumatoid factor (09/08/2022 11:28)  Sedimentation rate (09/08/2022 11:28)  C-reactive protein (09/08/2022 11:28)  JAKE Direct Reflex to 11 Biomarker (09/08/2022 11:28)  Comprehensive metabolic panel (10/28/2021 12:09)  CBC w AUTO Differential (10/28/2021 12:09)  Lipid panel (10/28/2021 12:09)                  Assessment and Plan      Diagnoses and all orders for this visit:    1. Annual physical exam (Primary)  Comments:  Appropriate screenings and vaccinations were reviewed with the pt and offered as indicated.  Pt counseled on healthy lifestyle including healthy diet, exercise.    2. Encounter for screening mammogram for malignant neoplasm of breast  -     Mammo Screening Digital Tomosynthesis Bilateral With CAD; Future    3. Anxiety  Comments:  Medical condition is stable.  Continue same therapy.  Will recheck at next regular appointment.  Orders:  -     FLUoxetine (PROzac) 20 MG capsule; Take 1 capsule by mouth Daily.  Dispense: 90 capsule; Refill: 3    4. DDD (degenerative disc disease), cervical  Comments:  Conservative measures.      5. Strain of trapezius muscle, unspecified laterality, initial encounter  -     baclofen (LIORESAL) 10 MG tablet; Take 1 tablet by mouth Every 8 (Eight) Hours As Needed for Muscle Spasms.  Dispense: 60 tablet; Refill: 1      Declines additional lab work today as insurance may not cover.           Follow Up     Return in about 1 year (around 11/3/2023) for Annual physical.

## 2022-11-23 ENCOUNTER — TELEPHONE (OUTPATIENT)
Dept: FAMILY MEDICINE CLINIC | Age: 57
End: 2022-11-23

## 2022-11-23 NOTE — TELEPHONE ENCOUNTER
Chart has been sent to [facility] to request official records from Everardo Rodriguez,  I am pleased to report that your mammogram is normal.   As you know, early detection of cancer is very important.  Although mammography is the most accurate method of early detection, not all cancers are found through mammography.  A thorough exam includes a combination of periodic mammograms, yearly physical examination by your doctor or health care provider and monthly breast self-examinations.  Remember, you should never ignore a breast lump or any other change in your breasts, even if your mammogram is normal. If you find a lump or other change, talk to your healthcare provider about it as soon as possible.  Recommend repeat one year.  MAGDALENE Brooks

## 2025-03-28 ENCOUNTER — LAB (OUTPATIENT)
Dept: LAB | Facility: HOSPITAL | Age: 60
End: 2025-03-28
Payer: OTHER GOVERNMENT

## 2025-03-28 ENCOUNTER — TELEPHONE (OUTPATIENT)
Dept: FAMILY MEDICINE CLINIC | Age: 60
End: 2025-03-28

## 2025-03-28 ENCOUNTER — OFFICE VISIT (OUTPATIENT)
Dept: FAMILY MEDICINE CLINIC | Age: 60
End: 2025-03-28
Payer: OTHER GOVERNMENT

## 2025-03-28 VITALS
SYSTOLIC BLOOD PRESSURE: 118 MMHG | BODY MASS INDEX: 29.16 KG/M2 | TEMPERATURE: 98.2 F | HEART RATE: 65 BPM | DIASTOLIC BLOOD PRESSURE: 80 MMHG | HEIGHT: 65 IN | WEIGHT: 175 LBS | OXYGEN SATURATION: 99 %

## 2025-03-28 DIAGNOSIS — Z00.00 ANNUAL PHYSICAL EXAM: ICD-10-CM

## 2025-03-28 DIAGNOSIS — R53.83 OTHER FATIGUE: ICD-10-CM

## 2025-03-28 DIAGNOSIS — F41.9 ANXIETY: ICD-10-CM

## 2025-03-28 DIAGNOSIS — Z11.59 SCREENING FOR VIRAL DISEASE: ICD-10-CM

## 2025-03-28 DIAGNOSIS — Z00.00 ANNUAL PHYSICAL EXAM: Primary | ICD-10-CM

## 2025-03-28 DIAGNOSIS — Z12.31 SCREENING MAMMOGRAM FOR BREAST CANCER: ICD-10-CM

## 2025-03-28 DIAGNOSIS — R00.2 PALPITATIONS: ICD-10-CM

## 2025-03-28 LAB
BASOPHILS # BLD AUTO: 0.02 10*3/MM3 (ref 0–0.2)
BASOPHILS NFR BLD AUTO: 0.4 % (ref 0–1.5)
DEPRECATED RDW RBC AUTO: 50.7 FL (ref 37–54)
EOSINOPHIL # BLD AUTO: 0.1 10*3/MM3 (ref 0–0.4)
EOSINOPHIL NFR BLD AUTO: 1.8 % (ref 0.3–6.2)
ERYTHROCYTE [DISTWIDTH] IN BLOOD BY AUTOMATED COUNT: 14.2 % (ref 12.3–15.4)
HCT VFR BLD AUTO: 39.9 % (ref 34–46.6)
HGB BLD-MCNC: 12.6 G/DL (ref 12–15.9)
IMM GRANULOCYTES # BLD AUTO: 0 10*3/MM3 (ref 0–0.05)
IMM GRANULOCYTES NFR BLD AUTO: 0 % (ref 0–0.5)
LYMPHOCYTES # BLD AUTO: 1.87 10*3/MM3 (ref 0.7–3.1)
LYMPHOCYTES NFR BLD AUTO: 34.1 % (ref 19.6–45.3)
MCH RBC QN AUTO: 30.4 PG (ref 26.6–33)
MCHC RBC AUTO-ENTMCNC: 31.6 G/DL (ref 31.5–35.7)
MCV RBC AUTO: 96.4 FL (ref 79–97)
MONOCYTES # BLD AUTO: 0.39 10*3/MM3 (ref 0.1–0.9)
MONOCYTES NFR BLD AUTO: 7.1 % (ref 5–12)
NEUTROPHILS NFR BLD AUTO: 3.1 10*3/MM3 (ref 1.7–7)
NEUTROPHILS NFR BLD AUTO: 56.6 % (ref 42.7–76)
PLATELET # BLD AUTO: 253 10*3/MM3 (ref 140–450)
PMV BLD AUTO: 11.7 FL (ref 6–12)
RBC # BLD AUTO: 4.14 10*6/MM3 (ref 3.77–5.28)
WBC NRBC COR # BLD AUTO: 5.48 10*3/MM3 (ref 3.4–10.8)

## 2025-03-28 PROCEDURE — 80050 GENERAL HEALTH PANEL: CPT

## 2025-03-28 PROCEDURE — 36415 COLL VENOUS BLD VENIPUNCTURE: CPT

## 2025-03-28 PROCEDURE — 86803 HEPATITIS C AB TEST: CPT

## 2025-03-28 PROCEDURE — 80061 LIPID PANEL: CPT

## 2025-03-28 PROCEDURE — 83735 ASSAY OF MAGNESIUM: CPT

## 2025-03-28 NOTE — ASSESSMENT & PLAN NOTE
Restarting fluoxetine 20 mg daily as previous  Orders:    FLUoxetine (PROzac) 20 MG capsule; Take 1 capsule by mouth Daily.

## 2025-03-28 NOTE — PROGRESS NOTES
Chief Complaint  Jennifer Lu presents to [facility] for Annual Exam and Fatigue    Subjective          History of Present Illness    Jennifer is here today for preventive exam.  Last seen in clinic 11/3/22.   Has received covid vaccine X 2.  UTD Tdap and zoster vaccine.   Declines influenza, PNA, Tdap vaccines.   Reports UTD colonoscopy at age 51 with Dr Martinez. Reports advised repeat 10 years.   Last mammogram normal 11/17/2022. Wishes to update   Hx hysterectomy.   Never smoker.      She was previously on fluoxetine for anxiety. She has not taken for the past 2 or so years. Would like to restart.   She c/o feeling easily winded when walking. Having some palpitations. She has had for long time but seem to be getting worse. C/o fatigue.  She is no longer on allergy injections.      Review of Systems   Constitutional:  Positive for fatigue. Negative for chills and fever.   HENT:  Negative for ear pain and sore throat.    Eyes:  Negative for blurred vision and redness.   Respiratory:  Positive for shortness of breath (with exertion). Negative for wheezing.    Cardiovascular:  Positive for palpitations. Negative for chest pain.   Gastrointestinal:  Negative for abdominal pain and vomiting.   Genitourinary:  Negative for frequency and urgency.   Skin:  Negative for rash.   Neurological:  Negative for seizures and syncope.   Psychiatric/Behavioral:  Negative for suicidal ideas and depressed mood.          Allergies   Allergen Reactions    Shellfish-Derived Products Anaphylaxis    Penicillins Rash      Past Medical History:   Diagnosis Date    Allergic     Anxiety     Arthritis     In feet and back    Cholelithiasis     Gallbladder removed 1989     Current Outpatient Medications   Medication Sig Dispense Refill    FLUoxetine (PROzac) 20 MG capsule Take 1 capsule by mouth Daily. 90 capsule 0     No current facility-administered medications for this visit.     Past Surgical History:  "  Procedure Laterality Date     SECTION      CHOLECYSTECTOMY      SUBTOTAL HYSTERECTOMY      TUBAL ABDOMINAL LIGATION        Social History     Tobacco Use    Smoking status: Never    Smokeless tobacco: Never   Vaping Use    Vaping status: Never Used   Substance Use Topics    Alcohol use: Never    Drug use: Never     Family History   Problem Relation Age of Onset    COPD Mother     Arthritis Mother     Thyroid disease Mother      Health Maintenance Due   Topic Date Due    COLORECTAL CANCER SCREENING  Never done    HEPATITIS C SCREENING  Never done    ANNUAL PHYSICAL  2023    MAMMOGRAM  2023      Immunization History   Administered Date(s) Administered    COVID-19 (MODERNA) 1st,2nd,3rd Dose Monovalent 2021, 2021    INFLUENZA SPLIT TRI 10/07/2020    Influenza TIV (IM) 10/01/2016    Influenza, Unspecified 2019    Shingrix 2021, 2022    Td (TDVAX) 2004    Tdap 2012, 2014, 2014    influenza Split 2021, 2022        Objective     Vitals:    25 1001   BP: 118/80   Pulse: 65   Temp: 98.2 °F (36.8 °C)   TempSrc: Oral   SpO2: 99%   Weight: 79.4 kg (175 lb)   Height: 165.1 cm (65\")     Body mass index is 29.12 kg/m².   BMI is >= 25 and <30. (Overweight) The following options were offered after discussion;: exercise counseling/recommendations            No results found.    Physical Exam  Vitals reviewed.   Constitutional:       General: She is not in acute distress.     Appearance: Normal appearance. She is well-developed.   HENT:      Head: Normocephalic and atraumatic.      Right Ear: Hearing, tympanic membrane and ear canal normal.      Left Ear: Hearing, tympanic membrane and ear canal normal.      Mouth/Throat:      Mouth: Mucous membranes are moist.   Eyes:      Extraocular Movements: Extraocular movements intact.      Pupils: Pupils are equal, round, and reactive to light.   Cardiovascular:      Rate and Rhythm: Normal rate and " regular rhythm.      Pulses: Normal pulses.      Heart sounds: Normal heart sounds.   Pulmonary:      Effort: Pulmonary effort is normal.      Breath sounds: Normal breath sounds.   Abdominal:      General: Bowel sounds are normal.      Palpations: Abdomen is soft.      Tenderness: There is no abdominal tenderness.   Musculoskeletal:         General: Normal range of motion.      Cervical back: Normal range of motion and neck supple.   Skin:     General: Skin is warm and dry.   Neurological:      Mental Status: She is alert and oriented to person, place, and time.   Psychiatric:         Mood and Affect: Mood normal.           Result Review :                        ECG 12 Lead    Date/Time: 3/28/2025 10:28 AM  Performed by: Laurel Chahal APRN    Authorized by: Laurel Chahal APRN  Comparison: not compared with previous ECG   Previous ECG: no previous ECG available  Rhythm: sinus rhythm  Rate: bradycardic  BPM: 56  Conduction: conduction normal  ST Segments: ST segments normal  T Waves: T waves normal  QRS axis: normal    Clinical impression: normal ECG              Assessment and Plan      Assessment & Plan  Annual physical exam  Appropriate screenings and vaccinations were reviewed with the pt and offered as indicated.   Will request copy of colonoscopy again.     Orders:    Comprehensive Metabolic Panel; Future    Lipid Panel; Future    CBC Auto Differential; Future    TSH Rfx On Abnormal To Free T4; Future    Screening mammogram for breast cancer  Screening mammogram ordered  Orders:    Mammo Screening Digital Tomosynthesis Bilateral With CAD; Future    Anxiety  Restarting fluoxetine 20 mg daily as previous  Orders:    FLUoxetine (PROzac) 20 MG capsule; Take 1 capsule by mouth Daily.    Palpitations  ECG without acute ST or T wave abnormalities. Will proceed with labs. Consider cxray, Holter monitor based on lab findings and symptom progression.   Orders:    ECG 12 Lead    TSH Rfx On Abnormal To Free T4;  Future    Magnesium; Future    Other fatigue  As above  Orders:    TSH Rfx On Abnormal To Free T4; Future    Screening for viral disease  Hep C lab  Orders:    Hepatitis C antibody; Future              Follow Up     Return in about 3 months (around 6/28/2025) for Recheck.

## 2025-03-29 LAB
ALBUMIN SERPL-MCNC: 3.8 G/DL (ref 3.5–5.2)
ALBUMIN/GLOB SERPL: 1.2 G/DL
ALP SERPL-CCNC: 72 U/L (ref 39–117)
ALT SERPL W P-5'-P-CCNC: 14 U/L (ref 1–33)
ANION GAP SERPL CALCULATED.3IONS-SCNC: 10 MMOL/L (ref 5–15)
AST SERPL-CCNC: 25 U/L (ref 1–32)
BILIRUB SERPL-MCNC: 0.6 MG/DL (ref 0–1.2)
BUN SERPL-MCNC: 12 MG/DL (ref 6–20)
BUN/CREAT SERPL: 12 (ref 7–25)
CALCIUM SPEC-SCNC: 9.4 MG/DL (ref 8.6–10.5)
CHLORIDE SERPL-SCNC: 104 MMOL/L (ref 98–107)
CHOLEST SERPL-MCNC: 183 MG/DL (ref 0–200)
CO2 SERPL-SCNC: 26 MMOL/L (ref 22–29)
CREAT SERPL-MCNC: 1 MG/DL (ref 0.57–1)
EGFRCR SERPLBLD CKD-EPI 2021: 65.4 ML/MIN/1.73
GLOBULIN UR ELPH-MCNC: 3.3 GM/DL
GLUCOSE SERPL-MCNC: 100 MG/DL (ref 65–99)
HCV AB SER QL: NORMAL
HDLC SERPL-MCNC: 60 MG/DL (ref 40–60)
LDLC SERPL CALC-MCNC: 113 MG/DL (ref 0–100)
LDLC/HDLC SERPL: 1.87 {RATIO}
MAGNESIUM SERPL-MCNC: 1.9 MG/DL (ref 1.6–2.6)
POTASSIUM SERPL-SCNC: 4.3 MMOL/L (ref 3.5–5.2)
PROT SERPL-MCNC: 7.1 G/DL (ref 6–8.5)
SODIUM SERPL-SCNC: 140 MMOL/L (ref 136–145)
TRIGL SERPL-MCNC: 54 MG/DL (ref 0–150)
TSH SERPL DL<=0.05 MIU/L-ACNC: 2.16 UIU/ML (ref 0.27–4.2)
VLDLC SERPL-MCNC: 10 MG/DL (ref 5–40)

## 2025-04-02 DIAGNOSIS — R06.00 DYSPNEA, UNSPECIFIED TYPE: Primary | ICD-10-CM

## 2025-04-08 ENCOUNTER — HOSPITAL ENCOUNTER (OUTPATIENT)
Dept: GENERAL RADIOLOGY | Facility: HOSPITAL | Age: 60
Discharge: HOME | End: 2025-04-08
Admitting: NURSE PRACTITIONER
Payer: OTHER GOVERNMENT

## 2025-04-08 DIAGNOSIS — R06.00 DYSPNEA, UNSPECIFIED TYPE: ICD-10-CM

## 2025-04-08 PROCEDURE — 71046 X-RAY EXAM CHEST 2 VIEWS: CPT

## 2025-04-17 ENCOUNTER — TELEPHONE (OUTPATIENT)
Dept: FAMILY MEDICINE CLINIC | Age: 60
End: 2025-04-17
Payer: OTHER GOVERNMENT

## 2025-04-17 NOTE — TELEPHONE ENCOUNTER
Left detailed message regarding overdue Mammo ordered 3-28-25. Informed pt of diagnostic imaging phone number to call and schedule at her convenience. 1st attempt

## 2025-04-24 ENCOUNTER — TELEPHONE (OUTPATIENT)
Dept: FAMILY MEDICINE CLINIC | Age: 60
End: 2025-04-24
Payer: OTHER GOVERNMENT

## 2025-04-24 NOTE — TELEPHONE ENCOUNTER
"Chief Complaint   Patient presents with   • Follow-up   • Nausea   • Heartburn     The patient has been having nausea for the past 6 months. The patient has nausea daily. At times it is constant and at times it will come and go. She is taking Phenergan with reasonable improvement. Eating can make the nausea worse.The patient has not had any further episodes of vomiting. She denies hematemesis.      The patient has a history of periumbilical and lower abdominal pain for the past 20 years. The pain has remained unchanged. The patient may have the pain 2-3 times per month. It can last 1-2 days. The patient has been evaluated by multiple physicians, but they can never find the cause of the pain. The pain is usually mild, but it can be severe. The pain is described as sharp. It feels like someone is \"sticking a knife\" through her \"vaginal area\". Eating does not affect the pain. The patient has not taken anything for the pain.     The patient has a long-standing history of intermittent heartburn. The patient has heartburn 1-2 times per month. Heartburn is improving. The patient has taken Omeprazole daily with good control of heartburn. Certain foods make the heartburn worse. No difficulty swallowing.      The patient has a history of anemia off and on for the past 28 years. The patient still has menstrual cycles, but they are very light. She has not had labs recently. She does not take iron supplements as her stools are black when she takes iron and it causes her to panic.     The patient denies constipation or diarrhea. There is no history of bright red blood per rectum or melena.     Nausea   This is a recurrent problem. Episode onset: July 2017. The problem occurs daily. The problem has been unchanged. Associated symptoms include arthralgias, chest pain, fatigue and joint swelling. Pertinent negatives include no abdominal pain, chills, coughing, fever, headaches, myalgias, nausea, rash or vomiting. The symptoms are " Spoke with pt regarding overdue Mammo ordered 3-28-25. Pt states she has mammogram scheduled at [facility] on 5-7-25. 1st attempt      aggravated by eating. Treatments tried: Phenergan. The treatment provided significant relief.   Heartburn   She complains of chest pain and heartburn. She reports no abdominal pain, no choking, no coughing, no dysphagia or no nausea. This is a chronic problem. Episode onset: over 5 years. The problem occurs occasionally (1-2 times per month). The problem has been gradually improving. The heartburn duration is several minutes. The heartburn is located in the substernum. The heartburn is of mild intensity. The heartburn does not wake her from sleep. The symptoms are aggravated by certain foods. Associated symptoms include fatigue. Pertinent negatives include no melena or weight loss. There are no known risk factors. She has tried a PPI for the symptoms. The treatment provided significant relief. Past procedures include an EGD (10/2017).   Abdominal Pain   This is a chronic problem. Episode onset: over 20 years. The onset quality is sudden. The problem occurs intermittently (2-3 times per month). The most recent episode lasted 2 days. The problem has been unchanged. The pain is located in the periumbilical region and suprapubic region. The pain is mild. The quality of the pain is sharp. The abdominal pain does not radiate. Associated symptoms include arthralgias. Pertinent negatives include no constipation, diarrhea, dysuria, fever, headaches, hematochezia, hematuria, melena, myalgias, nausea, vomiting or weight loss. Nothing aggravates the pain. The pain is relieved by nothing. She has tried nothing for the symptoms. Prior diagnostic workup includes upper endoscopy and lower endoscopy. Her past medical history is significant for GERD.   Anemia   Presents for follow-up visit. The condition has lasted for 28 years. Symptoms include bruises/bleeds easily. There has been no abdominal pain, fever, palpitations or weight loss. Signs of blood loss that are not present include hematemesis, hematochezia, melena and  menorrhagia. Past treatments include oral iron supplements. Procedure history includes EGD (10/2017).     Review of Systems   Constitutional: Positive for fatigue. Negative for appetite change, chills, fever, unexpected weight change and weight loss.   HENT: Negative for mouth sores, nosebleeds and trouble swallowing.    Eyes: Negative for discharge and redness.   Respiratory: Positive for shortness of breath. Negative for apnea, cough and choking.    Cardiovascular: Positive for chest pain. Negative for palpitations and leg swelling.   Gastrointestinal: Positive for heartburn. Negative for abdominal distention, abdominal pain, anal bleeding, blood in stool, constipation, diarrhea, dysphagia, hematemesis, hematochezia, melena, nausea and vomiting.   Endocrine: Negative for cold intolerance, heat intolerance and polydipsia.   Genitourinary: Negative for dysuria, hematuria, menorrhagia and urgency.   Musculoskeletal: Positive for arthralgias, back pain and joint swelling. Negative for myalgias.   Skin: Negative for rash.   Allergic/Immunologic: Positive for environmental allergies and food allergies. Negative for immunocompromised state.        Green veggie/dairy/corn/bologna-hot dog   Neurological: Positive for dizziness. Negative for seizures, syncope and headaches.   Hematological: Negative for adenopathy. Bruises/bleeds easily.   Psychiatric/Behavioral: Negative for dysphoric mood. The patient is nervous/anxious. The patient is not hyperactive.      Patient Active Problem List   Diagnosis   • Periumbilical abdominal pain   • Lower abdominal pain   • Heartburn   • Anemia   • Nausea   • Diverticulosis of colon   • Polyp of colon   • Internal hemorrhoids   • Esophagitis   • Gastritis without bleeding     Past Medical History:   Diagnosis Date   • Anxiety    • Arthritis    • Back pain    • Colon polyp 10/17/2017   • Depression    • Generalized headaches    • Hypertension    • Iron deficiency    • Snores    • Tattoo       Past Surgical History:   Procedure Laterality Date   • APPENDECTOMY     •  SECTION      x2   • CHOLECYSTECTOMY     • COLONOSCOPY N/A 10/17/2017    Procedure: COLONOSCOPY with cold biopsy polypectomy;  Surgeon: Geovanny Stuart MD;  Location: [facility] ENDOSCOPY;  Service:    • ENDOSCOPY N/A 10/18/2017    Procedure: ESOPHAGOGASTRODUODENOSCOPY with biopsies;  Surgeon: Geovanny Stuart MD;  Location: [facility] ENDOSCOPY;  Service:    • HAND SURGERY Left    • TONSILLECTOMY AND ADENOIDECTOMY     • TUBAL ABDOMINAL LIGATION     • UPPER GASTROINTESTINAL ENDOSCOPY  10/18/2017     Family History   Problem Relation Age of Onset   • Diabetes Mother    • Cancer Mother    • Hypertension Mother    • Prostate cancer Father    • Heart disease Father    • Diabetes Father    • Clotting disorder Sister    • Hypertension Sister    • Hypertension Brother    • Asthma Brother    • Clotting disorder Brother    • Colon cancer Neg Hx      Social History   Substance Use Topics   • Smoking status: Current Every Day Smoker     Packs/day: 1.00     Years: 31.00     Types: Cigarettes   • Smokeless tobacco: Never Used   • Alcohol use Yes      Comment: SOCIAL       Current Outpatient Prescriptions:   •  buPROPion XL (WELLBUTRIN XL) 150 MG 24 hr tablet, Take 300 mg by mouth Daily., Disp: , Rfl:   •  cholecalciferol (VITAMIN D3) 1000 units tablet, Take 2,000 Units by mouth Daily., Disp: , Rfl:   •  diltiaZEM CD (DILTIAZEM CD) 180 MG 24 hr capsule, Take 180 mg by mouth Daily., Disp: , Rfl:   •  escitalopram (LEXAPRO) 10 MG tablet, Take 10 mg by mouth 2 (Two) Times a Day., Disp: , Rfl:   •  gabapentin (NEURONTIN) 400 MG capsule, Take 400 mg by mouth 3 (Three) Times a Day., Disp: , Rfl:   •  losartan (COZAAR) 25 MG tablet, Take 25 mg by mouth 2 (Two) Times a Day., Disp: , Rfl:   •  meloxicam (MOBIC) 7.5 MG tablet, Take 7.5 mg by mouth Daily., Disp: , Rfl:   •  omeprazole (priLOSEC) 40 MG capsule, Take 1 capsule by mouth Daily., Disp: 30 capsule, Rfl:  "6  •  rOPINIRole (REQUIP) 0.5 MG tablet, Take 2 mg by mouth 2 (Two) Times a Day. Take 1 hour before bedtime. , Disp: , Rfl:   •  vitamin D (ERGOCALCIFEROL) 61946 units capsule capsule, Take 50,000 Units by mouth 1 (One) Time Per Week., Disp: , Rfl:   •  vitamin E 1000 UNIT capsule, Take 1,000 Units by mouth Daily., Disp: , Rfl:   •  promethazine (PHENERGAN) 25 MG tablet, Take 25 mg by mouth As Needed for Nausea or Vomiting., Disp: , Rfl:     Allergies   Allergen Reactions   • Other      PATIENT STATES  HAD A MEDICATION IN A ER AFTER RECEIVING A \"BITE\" THAT CAUSED HER WHOLE BODY TO TURN RED. HAD TO GO TO ANOTHER ER AND GET A MEDICINE TO TAKE CARE OF THE REDNESS CAN'T REMEMBER THE NAMES     /90  Pulse 66  Temp 98.4 °F (36.9 °C)  Resp 14  Ht 170.2 cm (67\")  Wt 73.9 kg (163 lb)  BMI 25.53 kg/m2    Physical Exam   Constitutional: She is oriented to person, place, and time. She appears well-developed and well-nourished. No distress.   HENT:   Head: Normocephalic and atraumatic.   Right Ear: Hearing and external ear normal.   Left Ear: Hearing and external ear normal.   Nose: Nose normal.   Mouth/Throat: Oropharynx is clear and moist and mucous membranes are normal. Mucous membranes are not pale, not dry and not cyanotic. No oral lesions. No oropharyngeal exudate.   Eyes: Conjunctivae and EOM are normal. Right eye exhibits no discharge. Left eye exhibits no discharge.   Neck: Trachea normal. Neck supple. No JVD present. No edema present. No thyroid mass and no thyromegaly present.   Cardiovascular: Normal rate, regular rhythm, S2 normal and normal heart sounds.  Exam reveals no gallop, no S3 and no friction rub.    No murmur heard.  Pulmonary/Chest: Effort normal and breath sounds normal. No respiratory distress. She exhibits no tenderness.   Abdominal: Normal appearance and bowel sounds are normal. She exhibits no distension, no ascites and no mass. There is no splenomegaly or hepatomegaly. There is no " tenderness. There is no rigidity, no rebound and no guarding. No hernia.       Vascular Status -  Her exam exhibits no right foot edema. Her exam exhibits no left foot edema.  Lymphadenopathy:     She has no cervical adenopathy.        Left: No supraclavicular adenopathy present.   Neurological: She is alert and oriented to person, place, and time. She has normal strength. No cranial nerve deficit or sensory deficit.   Skin: No rash noted. She is not diaphoretic. No cyanosis. No pallor. Nails show no clubbing.   Psychiatric: She has a normal mood and affect.   Stigmata of chronic liver disease:  None.  Asterixis:  None.    Laboratory Results:  Upon review of records:    Dated 4/24/2017 glucose 83 BUN 10 creatinine 0.9 sodium 143 potassium 4.7 chloride 110 CO2 23.7 calcium 9.0 albumin 3.7 total bilirubin 0.3 AST 19 ALT 20 alkaline phosphatase 73 WBC 8.01 hemoglobin 12.2 hematocrit 37.0 platelet count 184 MCV 91.6 iron count 34 TIBC 268 iron saturation 13%  TSH 1.19 vitamin D 50.7  hepatitis A IgM: Negative, hepatitis B core IgM: Negative, hepatitis B surface antigen: Negative, HCV antibody: Negative, HIV 1/2 screening: Negative    Procedures:  Upon review of records:    Colonoscopy dated 10/17/2017 reveals diverticulosis involving the cecum and proximal ascending colon as well as scant diverticulosis involving the left colon.  Colon polyp.  Internal hemorrhoids.  Hypertrophic anal papilla.  This test does not explain the cause of anemia.  Sigmoid colon polyp biopsy reveals hyperplastic polyp.  No evidence of dysplasia or malignancy.    EGD dated 10/18/2017 reveals localized area of gastric type mucosa with the proximal esophagus consistent with inlet patch.  Erythematous distal esophagitis.  No Villagran's esophagus.  Small sliding hiatal hernia less than 3 cm.  Erythematous gastritis with evidence of old healed ulceration.  This test does not explain the cause of anemia.  Second portion of duodenum biopsy reveals  benign duodenal mucosa with no specific pathologic diagnosis.  No evidence of villous blunting, increased intraepithelial lymphocytes, acute inflammation, dysplasia or malignancy.  Antrum body and annulus biopsy reveals mild to moderate chronic gastritis with reactive gastropathy.  No evidence of significant acute inflammation, dysplasia, malignancy or H. pylori.  Esophagus biopsies proximal reveals chronic esophagogastritis with reactive epithelial changes.  Benign squamous esophageal mucosa with reactive epithelial changes consistent with reflux.  Minute attached portion of glandular mucosa with chronic inflammation and reactive epithelial changes.  No evidence of intestinal metaplasia, dysplasia or malignancy.    Assessment and Plan:    Yoko was seen today for follow-up, nausea and heartburn.    Diagnoses and all orders for this visit:    Nausea  Comments:  Differentials include pancreatobiliary disease.  Orders:  -     CBC & Differential; Future  -     Comprehensive Metabolic Panel; Future  -     Lipase; Future  -     TSH; Future  -     US Abdomen Limited; Future    Heartburn  Comments:  Improving.  No Villagran's.  Orders:  -     omeprazole (priLOSEC) 40 MG capsule; Take 1 capsule by mouth Daily.    Anemia, unspecified type  Comments:  Etiology unclear.  Of interest, the patient has not had recent labs.  Orders:  -     CBC & Differential; Future  -     Comprehensive Metabolic Panel; Future  -     Lipase; Future  -     TSH; Future  -     US Abdomen Limited; Future    Periumbilical abdominal pain  Comments:  Differentials include pancreatobiliary disease.  Orders:  -     CBC & Differential; Future  -     Comprehensive Metabolic Panel; Future  -     Lipase; Future  -     TSH; Future  -     US Abdomen Limited; Future    Lower abdominal pain  Comments:  Etiology unclear.  Differentials include adhesions.    Diverticulosis of colon  Comments:  Diverticulosis involving the cecum and proximal ascending colon as well as  scant diverticulosis involving the left colon.    Polyp of colon, unspecified part of colon, unspecified type  Comments:  Hyperplastic.    Internal hemorrhoids  Comments:  Stable.  Uncomplicated.    Esophagitis  Comments:  Inlet patch.  Biopsies negative for Villagran's.    Gastritis without bleeding, unspecified chronicity, unspecified gastritis type  Comments:  Negative for H. pylori.        Plan  and Patient Instructions:  Patient Instructions   1. Antireflux measures: Avoid fried, fatty foods, alcohol, chocolate, coffee, tea,  soft drinks, peppermint and spearmint, spicy foods, tomatoes and tomato based foods, onion based foods, and smoking. Other antireflux measures include weight reduction if overweight, avoiding tight clothing around the abdomen, elevating the head of the bed 6 inches with blocks under the head board, and don't drink or eat before going to bed and avoid lying down immediately after meals.  2. Omeprazole 40 mg 1 tablet by mouth in the am 30 minutes before breakfast.  3. Phenergan 25 mg 1/2 - 1 tablet by mouth every 8-12 hours as needed for nausea.  4. Check CBC, CMP, Lipase, TSH  5. Abdominal ultrasound.  6. High fiber diet with liberal water intake.   7. Follow up colonoscopy in 5 years.  8. Follow up: 6 weeks    FRANK Manzanares

## 2025-07-02 ENCOUNTER — OFFICE VISIT (OUTPATIENT)
Dept: FAMILY MEDICINE CLINIC | Age: 60
End: 2025-07-02
Payer: OTHER GOVERNMENT

## 2025-07-02 VITALS
TEMPERATURE: 97.7 F | DIASTOLIC BLOOD PRESSURE: 62 MMHG | SYSTOLIC BLOOD PRESSURE: 121 MMHG | HEART RATE: 57 BPM | BODY MASS INDEX: 28.09 KG/M2 | HEIGHT: 65 IN | OXYGEN SATURATION: 100 % | WEIGHT: 168.6 LBS

## 2025-07-02 DIAGNOSIS — F41.9 ANXIETY: ICD-10-CM

## 2025-07-02 PROCEDURE — 99213 OFFICE O/P EST LOW 20 MIN: CPT | Performed by: NURSE PRACTITIONER

## 2025-07-02 NOTE — PROGRESS NOTES
Chief Complaint  Jennifer Lu presents to [facility] for Anxiety (3 month follow up )    Subjective          History of Present Illness    Jennifer is here today to follow up on anxiety. Current medication is fluoxetine 20 mg daily which was restarted at 3/28/25 visit. She has decreased libido since starting but feels that the improvement in her anxiety outweighs this. Wishes to continue the fluoxetine.     Review of Systems      Allergies   Allergen Reactions    Shellfish-Derived Products Anaphylaxis    Penicillins Rash      Past Medical History:   Diagnosis Date    Allergic     Anxiety     Arthritis     In feet and back    Cholelithiasis     Gallbladder removed      Current Outpatient Medications   Medication Sig Dispense Refill    FLUoxetine (PROzac) 20 MG capsule Take 1 capsule by mouth Daily. 90 capsule 2     No current facility-administered medications for this visit.     Past Surgical History:   Procedure Laterality Date     SECTION      CHOLECYSTECTOMY      SUBTOTAL HYSTERECTOMY      TUBAL ABDOMINAL LIGATION        Social History     Tobacco Use    Smoking status: Never    Smokeless tobacco: Never   Vaping Use    Vaping status: Never Used   Substance Use Topics    Alcohol use: Never    Drug use: Never     Family History   Problem Relation Age of Onset    COPD Mother     Arthritis Mother     Thyroid disease Mother      There are no preventive care reminders to display for this patient.     Immunization History   Administered Date(s) Administered    COVID-19 (MODERNA) 1st,2nd,3rd Dose Monovalent 2021, 2021    INFLUENZA SPLIT TRI 10/07/2020    Influenza TIV (IM) 10/01/2016    Influenza, Unspecified 2019    Shingrix 2021, 2022    Td (TDVAX) 2004    Tdap 2012, 2014, 2014    influenza Split 2021, 2022        Objective     Vitals:    25 1040   BP: 121/62   BP Location: Right arm   Patient  "Position: Sitting   Pulse: 57   Temp: 97.7 °F (36.5 °C)   TempSrc: Oral   SpO2: 100%   Weight: 76.5 kg (168 lb 9.6 oz)   Height: 165.1 cm (65\")     Body mass index is 28.06 kg/m².            No results found.    Physical Exam  Vitals reviewed.   Constitutional:       General: She is not in acute distress.     Appearance: Normal appearance. She is well-developed.   HENT:      Head: Normocephalic and atraumatic.   Cardiovascular:      Rate and Rhythm: Normal rate and regular rhythm.   Pulmonary:      Effort: Pulmonary effort is normal.      Breath sounds: Normal breath sounds.   Neurological:      Mental Status: She is alert and oriented to person, place, and time.   Psychiatric:         Mood and Affect: Mood and affect normal.           Result Review :                               Assessment and Plan      Assessment & Plan  Anxiety  Symptoms improved. Medical condition is stable.  Continue same therapy.  Will recheck at next regular appointment    Orders:    FLUoxetine (PROzac) 20 MG capsule; Take 1 capsule by mouth Daily.              Follow Up     Return in about 9 months (around 4/2/2026) for Annual physical, Or sooner as needed.              "

## 2025-07-02 NOTE — ASSESSMENT & PLAN NOTE
Symptoms improved. Medical condition is stable.  Continue same therapy.  Will recheck at next regular appointment    Orders:    FLUoxetine (PROzac) 20 MG capsule; Take 1 capsule by mouth Daily.